# Patient Record
Sex: MALE | Race: WHITE | NOT HISPANIC OR LATINO
[De-identification: names, ages, dates, MRNs, and addresses within clinical notes are randomized per-mention and may not be internally consistent; named-entity substitution may affect disease eponyms.]

---

## 2017-01-05 ENCOUNTER — APPOINTMENT (OUTPATIENT)
Dept: CARDIOLOGY | Facility: CLINIC | Age: 57
End: 2017-01-05

## 2017-01-05 ENCOUNTER — NON-APPOINTMENT (OUTPATIENT)
Age: 57
End: 2017-01-05

## 2017-01-05 VITALS
BODY MASS INDEX: 32.35 KG/M2 | DIASTOLIC BLOOD PRESSURE: 66 MMHG | HEIGHT: 70 IN | SYSTOLIC BLOOD PRESSURE: 140 MMHG | HEART RATE: 68 BPM | WEIGHT: 226 LBS

## 2017-02-14 ENCOUNTER — APPOINTMENT (OUTPATIENT)
Dept: CARDIOLOGY | Facility: CLINIC | Age: 57
End: 2017-02-14

## 2018-03-20 ENCOUNTER — APPOINTMENT (OUTPATIENT)
Dept: CARDIOLOGY | Facility: CLINIC | Age: 58
End: 2018-03-20

## 2018-06-13 ENCOUNTER — APPOINTMENT (OUTPATIENT)
Dept: CARDIOLOGY | Facility: CLINIC | Age: 58
End: 2018-06-13

## 2018-12-31 ENCOUNTER — APPOINTMENT (OUTPATIENT)
Dept: CARDIOLOGY | Facility: CLINIC | Age: 58
End: 2018-12-31
Payer: COMMERCIAL

## 2018-12-31 ENCOUNTER — NON-APPOINTMENT (OUTPATIENT)
Age: 58
End: 2018-12-31

## 2018-12-31 VITALS
HEART RATE: 72 BPM | BODY MASS INDEX: 32.07 KG/M2 | OXYGEN SATURATION: 100 % | WEIGHT: 224 LBS | DIASTOLIC BLOOD PRESSURE: 76 MMHG | HEIGHT: 70 IN | SYSTOLIC BLOOD PRESSURE: 153 MMHG

## 2018-12-31 VITALS — SYSTOLIC BLOOD PRESSURE: 126 MMHG | DIASTOLIC BLOOD PRESSURE: 74 MMHG

## 2018-12-31 DIAGNOSIS — N40.0 BENIGN PROSTATIC HYPERPLASIA WITHOUT LOWER URINARY TRACT SYMPMS: ICD-10-CM

## 2018-12-31 DIAGNOSIS — Z79.899 OTHER LONG TERM (CURRENT) DRUG THERAPY: ICD-10-CM

## 2018-12-31 PROCEDURE — 99214 OFFICE O/P EST MOD 30 MIN: CPT

## 2018-12-31 PROCEDURE — 93000 ELECTROCARDIOGRAM COMPLETE: CPT

## 2018-12-31 PROCEDURE — 36415 COLL VENOUS BLD VENIPUNCTURE: CPT

## 2018-12-31 RX ORDER — DEXTROAMPHETAMINE SACCHARATE, AMPHETAMINE ASPARTATE, DEXTROAMPHETAMINE SULFATE AND AMPHETAMINE SULFATE 2.5; 2.5; 2.5; 2.5 MG/1; MG/1; MG/1; MG/1
10 TABLET ORAL
Qty: 30 | Refills: 0 | Status: DISCONTINUED | COMMUNITY
Start: 2016-12-13 | End: 2018-12-31

## 2018-12-31 RX ORDER — DOXAZOSIN 2 MG/1
2 TABLET ORAL
Qty: 30 | Refills: 0 | Status: DISCONTINUED | COMMUNITY
Start: 2016-03-28 | End: 2018-12-31

## 2018-12-31 NOTE — HISTORY OF PRESENT ILLNESS
[FreeTextEntry1] : Two-year hiatus, reporting that he has been feeling well. He is generally active, but has not been exercising and has gained weight. No effort provoked symptoms with activities of daily living, and stair climbing.\par No c/o  throat,jaw, arm or upper back discomfort.  No dyspnea, orthopnea or PND.  No palpitations, dizziness or syncope.  No edema or claudication.\par \par Discontinued CPAP after a short trial. Wife still notes apneic periods.

## 2019-01-07 LAB
25(OH)D3 SERPL-MCNC: 28.3 NG/ML
ALBUMIN SERPL ELPH-MCNC: 4 G/DL
ALP BLD-CCNC: 50 U/L
ALT SERPL-CCNC: 24 U/L
ANION GAP SERPL CALC-SCNC: 10 MMOL/L
AST SERPL-CCNC: 19 U/L
BASOPHILS # BLD AUTO: 0.02 K/UL
BASOPHILS NFR BLD AUTO: 0.4 %
BILIRUB SERPL-MCNC: 1 MG/DL
BUN SERPL-MCNC: 15 MG/DL
CALCIUM SERPL-MCNC: 9.4 MG/DL
CHLORIDE SERPL-SCNC: 106 MMOL/L
CHOLEST SERPL-MCNC: 134 MG/DL
CHOLEST/HDLC SERPL: 3 RATIO
CO2 SERPL-SCNC: 27 MMOL/L
CREAT SERPL-MCNC: 0.95 MG/DL
EOSINOPHIL # BLD AUTO: 0.42 K/UL
EOSINOPHIL NFR BLD AUTO: 7.6 %
GLUCOSE SERPL-MCNC: 117 MG/DL
HBA1C MFR BLD HPLC: 6 %
HCT VFR BLD CALC: 45 %
HDLC SERPL-MCNC: 44 MG/DL
HGB BLD-MCNC: 14.5 G/DL
IMM GRANULOCYTES NFR BLD AUTO: 0.2 %
LDLC SERPL CALC-MCNC: 76 MG/DL
LYMPHOCYTES # BLD AUTO: 1.36 K/UL
LYMPHOCYTES NFR BLD AUTO: 24.5 %
MAN DIFF?: NORMAL
MCHC RBC-ENTMCNC: 31.2 PG
MCHC RBC-ENTMCNC: 32.2 GM/DL
MCV RBC AUTO: 96.8 FL
MONOCYTES # BLD AUTO: 0.42 K/UL
MONOCYTES NFR BLD AUTO: 7.6 %
NEUTROPHILS # BLD AUTO: 3.33 K/UL
NEUTROPHILS NFR BLD AUTO: 59.7 %
PLATELET # BLD AUTO: 210 K/UL
POTASSIUM SERPL-SCNC: 4.9 MMOL/L
PROT SERPL-MCNC: 7 G/DL
PSA FREE FLD-MCNC: 16 %
PSA FREE SERPL-MCNC: 0.33 NG/ML
PSA SERPL-MCNC: 2.07 NG/ML
RBC # BLD: 4.65 M/UL
RBC # FLD: 13.8 %
SODIUM SERPL-SCNC: 143 MMOL/L
TRIGL SERPL-MCNC: 70 MG/DL
WBC # FLD AUTO: 5.56 K/UL

## 2019-05-17 ENCOUNTER — APPOINTMENT (OUTPATIENT)
Dept: GASTROENTEROLOGY | Facility: CLINIC | Age: 59
End: 2019-05-17
Payer: COMMERCIAL

## 2019-05-17 VITALS
BODY MASS INDEX: 31.5 KG/M2 | HEIGHT: 70 IN | DIASTOLIC BLOOD PRESSURE: 81 MMHG | HEART RATE: 76 BPM | WEIGHT: 220 LBS | SYSTOLIC BLOOD PRESSURE: 140 MMHG

## 2019-05-17 DIAGNOSIS — K60.3 ANAL FISTULA: ICD-10-CM

## 2019-05-17 DIAGNOSIS — L29.0 PRURITUS ANI: ICD-10-CM

## 2019-05-17 PROCEDURE — 82274 ASSAY TEST FOR BLOOD FECAL: CPT | Mod: QW

## 2019-05-17 PROCEDURE — 99243 OFF/OP CNSLTJ NEW/EST LOW 30: CPT

## 2019-05-17 NOTE — HISTORY OF PRESENT ILLNESS
[FreeTextEntry1] : Sigrid has undergone at least 2-3 colonoscopies over the years (Dr. Blanc), found to have polyps on one occasion, but none on the most recent procedure 5 years ago. He had undergone surgery for anal fistula, now with intermittent pruritus ani and soiling. He also has had episodic nonprogressive dysphagia to rice. Family history is negative for GI neoplasia.

## 2019-05-17 NOTE — ASSESSMENT
[FreeTextEntry1] : 1. History of colon polyp, last colonoscopy 2014--rule out metachronous colorectal neoplasia.\par 2. History of anal fistula, status post surgery. Intermittent pruritus ani and soiling.\par 3. Episodic nonprogressive dysphagia--- suspect esophageal motility disorder.\par 4. Obesity.\par 5. Borderline type 2 diabetes mellitus.\par 6. Obstructive sleep apnea.\par 7. 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 Manriquez.\par 8. Hyperlipidemia.\par 9. History of depression.\par 10. Status post umbilical herniorrhaphy, TURP.\par \par Plan:\par 1. Forward labs for my review.\par 2. Medical record release for Dr. Blanc.\par 3. Agree with surveillance colonoscopy--Procedure, rationale, alternatives, material risks, anesthesia plan, MiraLax prep instructions were reviewed and brochure given.\par

## 2019-05-17 NOTE — REVIEW OF SYSTEMS
[Anxiety] : anxiety [Depression] : depression [Negative] : Endocrine [As Noted in HPI] : as noted in HPI

## 2019-05-17 NOTE — CONSULT LETTER
[Dear  ___] : Dear  [unfilled], [Consult Letter:] : I had the pleasure of evaluating your patient, [unfilled]. [Please see my note below.] : Please see my note below. [Consult Closing:] : Thank you very much for allowing me to participate in the care of this patient.  If you have any questions, please do not hesitate to contact me. [Sincerely,] : Sincerely, [FreeTextEntry3] : Vance James M.D.\par  [DrDay  ___] : Dr. LEAVITT

## 2019-05-17 NOTE — PHYSICAL EXAM
[General Appearance - Alert] : alert [General Appearance - In No Acute Distress] : in no acute distress [General Appearance - Well Nourished] : well nourished [General Appearance - Well Developed] : well developed [Sclera] : the sclera and conjunctiva were normal [Outer Ear] : the ears and nose were normal in appearance [Oropharynx] : the oropharynx was normal [Neck Appearance] : the appearance of the neck was normal [Neck Cervical Mass (___cm)] : no neck mass was observed [Jugular Venous Distention Increased] : there was no jugular-venous distention [Thyroid Diffuse Enlargement] : the thyroid was not enlarged [Thyroid Nodule] : there were no palpable thyroid nodules [Auscultation Breath Sounds / Voice Sounds] : lungs were clear to auscultation bilaterally [Heart Rate And Rhythm] : heart rate was normal and rhythm regular [Heart Sounds] : normal S1 and S2 [Heart Sounds Gallop] : no gallops [Murmurs] : no murmurs [Heart Sounds Pericardial Friction Rub] : no pericardial rub [Full Pulse] : the pedal pulses are present [Edema] : there was no peripheral edema [Bowel Sounds] : normal bowel sounds [Abdomen Soft] : soft [Abdomen Tenderness] : non-tender [Abdomen Mass (___ Cm)] : no abdominal mass palpated [Abdomen Hernia] : no hernia was discovered [Normal Sphincter Tone] : normal sphincter tone [No Rectal Mass] : no rectal mass [Prostate Enlargement] : the prostate was not enlarged [Prostate Tenderness] : the prostate was not tender [Cervical Lymph Nodes Enlarged Posterior Bilaterally] : posterior cervical [Cervical Lymph Nodes Enlarged Anterior Bilaterally] : anterior cervical [Supraclavicular Lymph Nodes Enlarged Bilaterally] : supraclavicular [Axillary Lymph Nodes Enlarged Bilaterally] : axillary [Femoral Lymph Nodes Enlarged Bilaterally] : femoral [Inguinal Lymph Nodes Enlarged Bilaterally] : inguinal [No CVA Tenderness] : no ~M costovertebral angle tenderness [No Spinal Tenderness] : no spinal tenderness [Abnormal Walk] : normal gait [Nail Clubbing] : no clubbing  or cyanosis of the fingernails [Musculoskeletal - Swelling] : no joint swelling seen [Motor Tone] : muscle strength and tone were normal [Skin Color & Pigmentation] : normal skin color and pigmentation [Skin Turgor] : normal skin turgor [] : no rash [Oriented To Time, Place, And Person] : oriented to person, place, and time [Impaired Insight] : insight and judgment were intact [Affect] : the affect was normal [Internal Hemorrhoid] : no internal hemorrhoids [External Hemorrhoid] : no external hemorrhoids [Occult Blood Positive] : stool was negative for occult blood [FreeTextEntry1] : FIT negative

## 2019-06-24 ENCOUNTER — LABORATORY RESULT (OUTPATIENT)
Age: 59
End: 2019-06-24

## 2019-06-24 ENCOUNTER — APPOINTMENT (OUTPATIENT)
Dept: GASTROENTEROLOGY | Facility: CLINIC | Age: 59
End: 2019-06-24
Payer: COMMERCIAL

## 2019-06-24 PROCEDURE — 45380 COLONOSCOPY AND BIOPSY: CPT | Mod: 33

## 2019-12-30 ENCOUNTER — NON-APPOINTMENT (OUTPATIENT)
Age: 59
End: 2019-12-30

## 2019-12-30 ENCOUNTER — APPOINTMENT (OUTPATIENT)
Dept: CARDIOLOGY | Facility: CLINIC | Age: 59
End: 2019-12-30
Payer: COMMERCIAL

## 2019-12-30 VITALS
DIASTOLIC BLOOD PRESSURE: 83 MMHG | HEART RATE: 75 BPM | BODY MASS INDEX: 32.64 KG/M2 | WEIGHT: 228 LBS | OXYGEN SATURATION: 96 % | SYSTOLIC BLOOD PRESSURE: 137 MMHG | HEIGHT: 70 IN

## 2019-12-30 VITALS — DIASTOLIC BLOOD PRESSURE: 82 MMHG | SYSTOLIC BLOOD PRESSURE: 128 MMHG

## 2019-12-30 PROCEDURE — 99214 OFFICE O/P EST MOD 30 MIN: CPT

## 2019-12-30 PROCEDURE — 93000 ELECTROCARDIOGRAM COMPLETE: CPT

## 2019-12-30 NOTE — HISTORY OF PRESENT ILLNESS
[FreeTextEntry1] : Presents afater a 1 year hiatus, reporting that he has been feeling well. He is generally active, but has not been exercising and has gained weight. No effort provoked symptoms with activities of daily living, and stair climbing.\par No c/o  throat,jaw, arm or upper back discomfort.  No dyspnea, orthopnea or PND.  No palpitations, dizziness or syncope.  No edema or claudication.\par \par

## 2020-01-20 ENCOUNTER — INPATIENT (INPATIENT)
Facility: HOSPITAL | Age: 60
LOS: 2 days | Discharge: HOME | DRG: 390 | End: 2020-01-23
Attending: SURGERY | Admitting: SURGERY
Payer: COMMERCIAL

## 2020-01-20 VITALS
DIASTOLIC BLOOD PRESSURE: 94 MMHG | HEART RATE: 80 BPM | HEIGHT: 70 IN | OXYGEN SATURATION: 99 % | TEMPERATURE: 98 F | WEIGHT: 225.09 LBS | SYSTOLIC BLOOD PRESSURE: 165 MMHG | RESPIRATION RATE: 20 BRPM

## 2020-01-20 DIAGNOSIS — K42.9 UMBILICAL HERNIA WITHOUT OBSTRUCTION OR GANGRENE: Chronic | ICD-10-CM

## 2020-01-20 LAB
ALBUMIN SERPL ELPH-MCNC: 4.7 G/DL — SIGNIFICANT CHANGE UP (ref 3.3–5)
ALP SERPL-CCNC: 68 U/L — SIGNIFICANT CHANGE UP (ref 40–120)
ALT FLD-CCNC: 36 U/L — SIGNIFICANT CHANGE UP (ref 10–45)
ANION GAP SERPL CALC-SCNC: 14 MMOL/L — SIGNIFICANT CHANGE UP (ref 5–17)
APTT BLD: 29.8 SEC — SIGNIFICANT CHANGE UP (ref 27.5–36.3)
AST SERPL-CCNC: 21 U/L — SIGNIFICANT CHANGE UP (ref 10–40)
BASE EXCESS BLDV CALC-SCNC: 5.5 MMOL/L — HIGH (ref -2–2)
BASOPHILS # BLD AUTO: 0.05 K/UL — SIGNIFICANT CHANGE UP (ref 0–0.2)
BASOPHILS NFR BLD AUTO: 0.4 % — SIGNIFICANT CHANGE UP (ref 0–2)
BILIRUB SERPL-MCNC: 1.4 MG/DL — HIGH (ref 0.2–1.2)
BUN SERPL-MCNC: 17 MG/DL — SIGNIFICANT CHANGE UP (ref 7–23)
CA-I SERPL-SCNC: 1.25 MMOL/L — SIGNIFICANT CHANGE UP (ref 1.12–1.3)
CALCIUM SERPL-MCNC: 10.3 MG/DL — SIGNIFICANT CHANGE UP (ref 8.4–10.5)
CHLORIDE BLDV-SCNC: 106 MMOL/L — SIGNIFICANT CHANGE UP (ref 96–108)
CHLORIDE SERPL-SCNC: 101 MMOL/L — SIGNIFICANT CHANGE UP (ref 96–108)
CO2 BLDV-SCNC: 35 MMOL/L — HIGH (ref 22–30)
CO2 SERPL-SCNC: 26 MMOL/L — SIGNIFICANT CHANGE UP (ref 22–31)
CREAT SERPL-MCNC: 0.86 MG/DL — SIGNIFICANT CHANGE UP (ref 0.5–1.3)
EOSINOPHIL # BLD AUTO: 0.19 K/UL — SIGNIFICANT CHANGE UP (ref 0–0.5)
EOSINOPHIL NFR BLD AUTO: 1.5 % — SIGNIFICANT CHANGE UP (ref 0–6)
GAS PNL BLDV: 138 MMOL/L — SIGNIFICANT CHANGE UP (ref 135–145)
GAS PNL BLDV: SIGNIFICANT CHANGE UP
GAS PNL BLDV: SIGNIFICANT CHANGE UP
GLUCOSE BLDV-MCNC: 143 MG/DL — HIGH (ref 70–99)
GLUCOSE SERPL-MCNC: 146 MG/DL — HIGH (ref 70–99)
HCO3 BLDV-SCNC: 33 MMOL/L — HIGH (ref 21–29)
HCT VFR BLD CALC: 49.7 % — SIGNIFICANT CHANGE UP (ref 39–50)
HCT VFR BLDA CALC: 54 % — HIGH (ref 39–50)
HGB BLD CALC-MCNC: 17.8 G/DL — HIGH (ref 13–17)
HGB BLD-MCNC: 17.4 G/DL — HIGH (ref 13–17)
IMM GRANULOCYTES NFR BLD AUTO: 0.3 % — SIGNIFICANT CHANGE UP (ref 0–1.5)
INR BLD: 1.07 RATIO — SIGNIFICANT CHANGE UP (ref 0.88–1.16)
LACTATE BLDV-MCNC: 1.6 MMOL/L — SIGNIFICANT CHANGE UP (ref 0.7–2)
LIDOCAIN IGE QN: 14 U/L — SIGNIFICANT CHANGE UP (ref 7–60)
LYMPHOCYTES # BLD AUTO: 1.19 K/UL — SIGNIFICANT CHANGE UP (ref 1–3.3)
LYMPHOCYTES # BLD AUTO: 9.3 % — LOW (ref 13–44)
MCHC RBC-ENTMCNC: 31.9 PG — SIGNIFICANT CHANGE UP (ref 27–34)
MCHC RBC-ENTMCNC: 35 GM/DL — SIGNIFICANT CHANGE UP (ref 32–36)
MCV RBC AUTO: 91.2 FL — SIGNIFICANT CHANGE UP (ref 80–100)
MONOCYTES # BLD AUTO: 0.68 K/UL — SIGNIFICANT CHANGE UP (ref 0–0.9)
MONOCYTES NFR BLD AUTO: 5.3 % — SIGNIFICANT CHANGE UP (ref 2–14)
NEUTROPHILS # BLD AUTO: 10.64 K/UL — HIGH (ref 1.8–7.4)
NEUTROPHILS NFR BLD AUTO: 83.2 % — HIGH (ref 43–77)
NRBC # BLD: 0 /100 WBCS — SIGNIFICANT CHANGE UP (ref 0–0)
PCO2 BLDV: 59 MMHG — HIGH (ref 35–50)
PH BLDV: 7.37 — SIGNIFICANT CHANGE UP (ref 7.35–7.45)
PLATELET # BLD AUTO: 243 K/UL — SIGNIFICANT CHANGE UP (ref 150–400)
PO2 BLDV: <20 MMHG — LOW (ref 25–45)
POTASSIUM BLDV-SCNC: 4.4 MMOL/L — SIGNIFICANT CHANGE UP (ref 3.5–5.3)
POTASSIUM SERPL-MCNC: 4.3 MMOL/L — SIGNIFICANT CHANGE UP (ref 3.5–5.3)
POTASSIUM SERPL-SCNC: 4.3 MMOL/L — SIGNIFICANT CHANGE UP (ref 3.5–5.3)
PROT SERPL-MCNC: 7.7 G/DL — SIGNIFICANT CHANGE UP (ref 6–8.3)
PROTHROM AB SERPL-ACNC: 12.3 SEC — SIGNIFICANT CHANGE UP (ref 10–12.9)
RBC # BLD: 5.45 M/UL — SIGNIFICANT CHANGE UP (ref 4.2–5.8)
RBC # FLD: 12.9 % — SIGNIFICANT CHANGE UP (ref 10.3–14.5)
SAO2 % BLDV: 20 % — LOW (ref 67–88)
SODIUM SERPL-SCNC: 141 MMOL/L — SIGNIFICANT CHANGE UP (ref 135–145)
TROPONIN T, HIGH SENSITIVITY RESULT: <6 NG/L — SIGNIFICANT CHANGE UP (ref 0–51)
TROPONIN T, HIGH SENSITIVITY RESULT: <6 NG/L — SIGNIFICANT CHANGE UP (ref 0–51)
WBC # BLD: 12.79 K/UL — HIGH (ref 3.8–10.5)
WBC # FLD AUTO: 12.79 K/UL — HIGH (ref 3.8–10.5)

## 2020-01-20 PROCEDURE — 99218: CPT

## 2020-01-20 PROCEDURE — 93010 ELECTROCARDIOGRAM REPORT: CPT | Mod: 77

## 2020-01-20 RX ORDER — FAMOTIDINE 10 MG/ML
20 INJECTION INTRAVENOUS ONCE
Refills: 0 | Status: COMPLETED | OUTPATIENT
Start: 2020-01-20 | End: 2020-01-20

## 2020-01-20 RX ORDER — ATORVASTATIN CALCIUM 80 MG/1
40 TABLET, FILM COATED ORAL AT BEDTIME
Refills: 0 | Status: DISCONTINUED | OUTPATIENT
Start: 2020-01-20 | End: 2020-01-21

## 2020-01-20 RX ORDER — METFORMIN HYDROCHLORIDE 850 MG/1
500 TABLET ORAL
Refills: 0 | Status: DISCONTINUED | OUTPATIENT
Start: 2020-01-20 | End: 2020-01-21

## 2020-01-20 RX ORDER — PANTOPRAZOLE SODIUM 20 MG/1
40 TABLET, DELAYED RELEASE ORAL ONCE
Refills: 0 | Status: COMPLETED | OUTPATIENT
Start: 2020-01-20 | End: 2020-01-20

## 2020-01-20 RX ORDER — ACETAMINOPHEN 500 MG
975 TABLET ORAL ONCE
Refills: 0 | Status: COMPLETED | OUTPATIENT
Start: 2020-01-20 | End: 2020-01-20

## 2020-01-20 RX ORDER — ONDANSETRON 8 MG/1
4 TABLET, FILM COATED ORAL ONCE
Refills: 0 | Status: COMPLETED | OUTPATIENT
Start: 2020-01-20 | End: 2020-01-20

## 2020-01-20 RX ORDER — LIDOCAINE 4 G/100G
10 CREAM TOPICAL ONCE
Refills: 0 | Status: COMPLETED | OUTPATIENT
Start: 2020-01-20 | End: 2020-01-20

## 2020-01-20 RX ADMIN — Medication 30 MILLILITER(S): at 13:37

## 2020-01-20 RX ADMIN — ONDANSETRON 4 MILLIGRAM(S): 8 TABLET, FILM COATED ORAL at 15:15

## 2020-01-20 RX ADMIN — Medication 10 MILLIGRAM(S): at 20:15

## 2020-01-20 RX ADMIN — ONDANSETRON 4 MILLIGRAM(S): 8 TABLET, FILM COATED ORAL at 23:50

## 2020-01-20 RX ADMIN — FAMOTIDINE 20 MILLIGRAM(S): 10 INJECTION INTRAVENOUS at 13:37

## 2020-01-20 RX ADMIN — Medication 975 MILLIGRAM(S): at 19:42

## 2020-01-20 RX ADMIN — LIDOCAINE 10 MILLILITER(S): 4 CREAM TOPICAL at 19:42

## 2020-01-20 RX ADMIN — PANTOPRAZOLE SODIUM 40 MILLIGRAM(S): 20 TABLET, DELAYED RELEASE ORAL at 19:46

## 2020-01-20 NOTE — ED PROVIDER NOTE - NSFOLLOWUPINSTRUCTIONS_ED_ALL_ED_FT
Follow up with Dr. Ly tomorrow regarding your symptoms - it is felt that your symptoms may  be related to reflux or gastritis but you should get follow up for possible stress test to determine if this is atypical cardiac symptoms  Bring a copy of your test results with you to your appointment  Continue your current medication regimen  Return to the Emergency Room if you experience new or worsening symptoms  Take pepcid 2x per day   Take maalox before meals    Chest Pain    Chest pain can be caused by many different conditions which may or may not be dangerous. Causes include heartburn, lung infections, heart attack, blood clot in lungs, skin infections, strain or damage to muscle, cartilage, or bones, etc. In addition to a history and physical examination, an electrocardiogram (ECG) or other lab tests may have been performed to determine the cause of your chest pain. Follow up with your primary care provider or with a cardiologist as instructed.     SEEK IMMEDIATE MEDICAL CARE IF YOU HAVE ANY OF THE FOLLOWING SYMPTOMS: worsening chest pain, coughing up blood, unexplained back/neck/jaw pain, severe abdominal pain, dizziness or lightheadedness, fainting, shortness of breath, sweaty or clammy skin, vomiting, or racing heart beat. These symptoms may represent a serious problem that is an emergency. Do not wait to see if the symptoms will go away. Get medical help right away. Call 911 and do not drive yourself to the hospital.       Abdominal Pain    Many things can cause abdominal pain. Many times, abdominal pain is not caused by a disease and will improve without treatment. Your health care provider will do a physical exam to determine if there is a dangerous cause of your pain; blood tests and imaging may help determine the cause of your pain. However, in many cases, no cause may be found and you may need further testing as an outpatient. Monitor your abdominal pain for any changes.     SEEK IMMEDIATE MEDICAL CARE IF YOU HAVE ANY OF THE FOLLOWING SYMPTOMS: worsening abdominal pain, uncontrollable vomiting, profuse diarrhea, inability to have bowel movements or pass gas, black or bloody stools, fever accompanying chest pain or back pain, or fainting. These symptoms may represent a serious problem that is an emergency. Do not wait to see if the symptoms will go away. Get medical help right away. Call 911 and do not drive yourself to the hospital.

## 2020-01-20 NOTE — ED CDU PROVIDER DISPOSITION NOTE - ATTENDING CONTRIBUTION TO CARE
59 M w/ PMH of HLD, HTN Depression and recent MRI of the abdomen 2 weeks ago, found to have pneumatosis of the jejunum and SBO seen by sx w/ NG tube placed. Pt initially admitted for nuclear stress test but overnight was found to have concerning findings on CT scan. Pt currently w/ improvement of abd pain on my exam at 934 AM. Has NG tube in place, abd w/ no tenderness, hypoactive bowel sounds in all 4 quadrants. will tx w/ zosyn.

## 2020-01-20 NOTE — ED CDU PROVIDER DISPOSITION NOTE - NSFOLLOWUPINSTRUCTIONS_ED_ALL_ED_FT
1. Follow up with your PCP in 1-2 days.     Additionally please follow up with your cardiologist within 2-3 days.     2. Show copies of your reports given to you.       Take all of your other medications as previously prescribed.     3. Please return to the ED immediately if you develop any worsening or continued chest pain, shortness of breath, palpitations, weakness, nausea/vomiting, lightheadedness, or for any other concerning symptoms.

## 2020-01-20 NOTE — ED CDU PROVIDER DISPOSITION NOTE - CLINICAL COURSE
60 y/o male HTN, HLD presents to the ED for epigastric pain. patient states last night he began feeling bloated. this morning had breakfast, felt fine and then a few hours later felt epigastric pain, pressure in the area described as a "discomfort" rather than true pain, belching (onset 8 am). pain was radiating to the lower abdomen. no vomiting/diarrhea. no fever/chills. no chest pain or sob. abd pain did not resolve with tylenol or tums.  Cardio: Dr. Cerna  ED Course: WBC 12.79, Trop <6, remainder of labs including lipase non-actionable. CXR-punctate calcified granulomas. Pt evaluated by his cardiologist in the ED, referred to CDU for tele, frequent eval, and stress test.  CDU Course: Stress test shows____. Cardiology recommends____. 60 y/o male HTN, HLD presents to the ED for epigastric pain. patient states last night he began feeling bloated. this morning had breakfast, felt fine and then a few hours later felt epigastric pain, pressure in the area described as a "discomfort" rather than true pain, belching (onset 8 am). pain was radiating to the lower abdomen. no vomiting/diarrhea. no fever/chills. no chest pain or sob. abd pain did not resolve with tylenol or tums.  Cardio: Dr. Cerna  ED Course: WBC 12.79, Trop <6, remainder of labs including lipase non-actionable. CXR-punctate calcified granulomas. Pt evaluated by his cardiologist in the ED, referred to CDU for tele, frequent eval, and stress test.  CDU Course: pt had continued epigastric pain, in setting of gallstones, surgery consulted. Surgery recommended CT abdomen which revealed SBO. NG tube placed successfully by Surgery Resident. Pt reports relief. pt admitted to their service.

## 2020-01-20 NOTE — ED PROVIDER NOTE - PROGRESS NOTE DETAILS
discussed results with patient, feeling improved at this time, offered CDU for stress/cardiac testing tomorrow patient prefers to follow up with Dr. Ly. understands return precautions - Chen Faulkner PA-C patient discussed with his cardiologist and would now rather get testing done while he is here. patient cardiologist to bedside who agrees with plan to proceed with stress test from cdu - Chen Faulkner PA-C

## 2020-01-20 NOTE — ED ADULT NURSE NOTE - CHPI ED NUR SYMPTOMS NEG
no hematuria/no blood in stool/no dysuria/no diarrhea/no vomiting/no burning urination/no abdominal distension/no chills/no fever/no nausea

## 2020-01-20 NOTE — ED ADULT NURSE NOTE - OBJECTIVE STATEMENT
58 yo M aaox4 c/o abd pain. Near epigastric area. Denies NVD. Denies CP dizziness weakness or sob. IV line placed labs drawn and sent. EKG done and pt placed on CM. PMH abd hernia.

## 2020-01-20 NOTE — ED CDU PROVIDER INITIAL DAY NOTE - ATTENDING CONTRIBUTION TO CARE
****ATTENDING**** 60yo male hx listed presents with epigastric discomfort this morning. States the symptoms were dull and tightness and took time to relieve with time and tums. + belching. Pt denies chest pain, palp, sob. + diaphoresis w symptoms. No abd pain, n/v/d. Nml bms.   No recent travel.   on exam, Patient is awake,alert,oriented x 3. Patient is well appearing and in no acute distress. Patient's chest is clear to ausculation, +s1s2. Abdomen is soft nd/nt +BS. Extremity with no swelling or calf tenderness.   Pt is well appearing, EKG reviewed. Stress test many years ago.  Labs and EKG reviewed. Patient's cardiologist in ED, agree w plan.   Admit to CDU for observation, tele monitoring and stress test.

## 2020-01-20 NOTE — ED PROVIDER NOTE - OBJECTIVE STATEMENT
60 y/o male HTN, HLD presents to the ED for epigastric pain. patient states last night he began feeling bloated. this morning had breakfast, felt fine and then a few hours later felt epigastric pain, pressure in the area described as a "discomfort" rather than true pain, belching. pain was radiating to the lower abdomen. no vomiting/diarrhea. no fever/chills. no chest pain or sob. abd pain did not resolve with tylenol or tums. 58 y/o male HTN, HLD presents to the ED for epigastric pain. patient states last night he began feeling bloated. this morning had breakfast, felt fine and then a few hours later felt epigastric pain, pressure in the area described as a "discomfort" rather than true pain, belching (onset 8 am). pain was radiating to the lower abdomen. no vomiting/diarrhea. no fever/chills. no chest pain or sob. abd pain did not resolve with tylenol or tums. 60 y/o male HTN, HLD presents to the ED for epigastric pain. patient states last night he began feeling bloated. this morning had breakfast, felt fine and then a few hours later felt epigastric pain, pressure in the area described as a "discomfort" rather than true pain, belching (onset 8 am). pain was radiating to the lower abdomen. no vomiting/diarrhea. no fever/chills. no chest pain or sob. abd pain did not resolve with tylenol or tums.  fawad martínez

## 2020-01-20 NOTE — ED PROVIDER NOTE - ATTENDING CONTRIBUTION TO CARE
****ATTENDING**** 60yo male hx listed presents with epigastric discomfort this morning. States the symptoms were dull and tightness and took time to relieve with time and tums. + belching. Pt denies chest pain, palp, sob. + diaphoresis w symptoms. No abd pain, n/v/d. Nml bms.   No recent travel.   on exam, Patient is awake,alert,oriented x 3. Patient is well appearing and in no acute distress. Patient's chest is clear to ausculation, +s1s2. Abdomen is soft nd/nt +BS. Extremity with no swelling or calf tenderness.   Pt is well appearing, EKG reviewed. Stress test many years ago.  Check labs, Xray chest. Eval for ACS.

## 2020-01-20 NOTE — ED CDU PROVIDER INITIAL DAY NOTE - PROGRESS NOTE DETAILS
pt c/o pain in the epigastrum after eating. will give GI cocktail, EKG ordered. CDU PROGRESS NOTE CARLOZ ROMERO: Received pt at 1900 sign-out. Pt resting in stretcher in NAD. Case/plan reviewed. VSS. EKG NSR rate 80. Pt is aox3, ambulatory around unit with steady gait. S1 S2 noted, RRR, lungs CTA b/l, BS x4 with soft, nontender abdomen. Pt was medicated for pain and states pain is subsiding. Will continue to monitor overnight. CDU PROGRESS NOTE PA NICK: Pt resting in stretcher in NAD. Case/plan reviewed. VSS. EKG NSR rate 80. Pt is aox3, ambulatory around unit with steady gait. S1 S2 noted, RRR, lungs CTA b/l, BS x4 with soft, nontender abdomen. Pt was medicated for pain and states pain is subsiding. Will continue to monitor overnight. CDU PROGRESS NOTE CARLOZ ROMERO: Pt resting in stretcher in NAD, reports pain is intermittent and has subsided now, but currently with nausea. rate 80.  Abdomen BS x4 with soft, nontender abdomen, no rebound or guarding. Will give Zofran 4mg IVP and continue to monitor.

## 2020-01-20 NOTE — ED CDU PROVIDER INITIAL DAY NOTE - PHYSICAL EXAMINATION
GEN: Pt in NAD, A&O x3.  PSYCH: Affect appropriate.  EYES: Sclera white w/o injection.   ENT: Head NCAT. Nose w/o deformity. No auricular TTP. MMM. Neck supple FROM.   RESP: No chest wall tenderness, CTA b/l, no wheezes, rales, or rhonchi.   CARDIAC: RRR, clear distinct S1, S2, no appreciable murmurs.  ABD: Abdomen without obvious deformity. Abdomen soft, mild tenderness to palpation in the epigastric area, ngative Rodriguez's sign, no rebound or guarding. No CVAT b/l.  VASC: 2+ radial and dorsalis pedis pulses b/l. No edema or calf tenderness.  SKIN: No rashes on the trunk. GEN: Pt in NAD, A&O x3.  PSYCH: Affect appropriate.  EYES: Sclera white w/o injection.   ENT: Head NCAT. Nose w/o deformity. No auricular TTP. MMM. Neck supple FROM.   RESP: No chest wall tenderness, CTA b/l, no wheezes, rales, or rhonchi.   CARDIAC: RRR, clear distinct S1, S2, no appreciable murmurs.  ABD: Abdomen without obvious deformity. Abdomen soft, mild tenderness to palpation in the epigastric area, negative Rodriguez's sign, no rebound or guarding. No CVAT b/l.  VASC: 2+ radial and dorsalis pedis pulses b/l. No edema or calf tenderness.  SKIN: No rashes on the trunk.

## 2020-01-20 NOTE — ED CDU PROVIDER INITIAL DAY NOTE - OBJECTIVE STATEMENT
60 y/o male HTN, HLD presents to the ED for epigastric pain. patient states last night he began feeling bloated. this morning had breakfast, felt fine and then a few hours later felt epigastric pain, pressure in the area described as a "discomfort" rather than true pain, belching (onset 8 am), +associated nausea, 5/10, nothing made it feel better. pain was radiating to the lower abdomen. no vomiting/diarrhea. no fever/chills. no chest pain or sob. abd pain did not resolve with tylenol or tums.  Cardio: Dr. Cerna  ED Course: WBC 12.79, Trop <6, remainder of labs including lipase non-actionable. CXR-punctate calcified granulomas. Pt evaluated by his cardiologist in the ED, referred to CDU for tele, frequent eval, and stress test. 60 y/o male HTN, HLD presents to the ED for epigastric pain. patient states last night he began feeling bloated. this morning had breakfast, felt fine and then a few hours later felt epigastric pain, pressure in the area described as a "discomfort" rather than true pain, belching (onset 8 am), +associated nausea, 5/10, nothing made it feel better. pain was radiating to the lower abdomen. no vomiting/diarrhea. no fever/chills. no chest pain or sob. abd pain did not resolve with tylenol or tums.  Cardio: Dr. Cerna  ED Course: WBC 12.79, Trop <6, remainder of labs including lipase non-actionable. CXR- punctate calcified granulomas. Pt evaluated by his cardiologist in the ED, referred to CDU for tele, frequent eval, and stress test.

## 2020-01-21 DIAGNOSIS — Z90.79 ACQUIRED ABSENCE OF OTHER GENITAL ORGAN(S): Chronic | ICD-10-CM

## 2020-01-21 DIAGNOSIS — K56.609 UNSPECIFIED INTESTINAL OBSTRUCTION, UNSPECIFIED AS TO PARTIAL VERSUS COMPLETE OBSTRUCTION: ICD-10-CM

## 2020-01-21 LAB
ALBUMIN SERPL ELPH-MCNC: 3.6 G/DL — SIGNIFICANT CHANGE UP (ref 3.3–5)
ALBUMIN SERPL ELPH-MCNC: 4.3 G/DL — SIGNIFICANT CHANGE UP (ref 3.3–5)
ALP SERPL-CCNC: 49 U/L — SIGNIFICANT CHANGE UP (ref 40–120)
ALP SERPL-CCNC: 60 U/L — SIGNIFICANT CHANGE UP (ref 40–120)
ALT FLD-CCNC: 28 U/L — SIGNIFICANT CHANGE UP (ref 10–45)
ALT FLD-CCNC: 34 U/L — SIGNIFICANT CHANGE UP (ref 10–45)
ANION GAP SERPL CALC-SCNC: 12 MMOL/L — SIGNIFICANT CHANGE UP (ref 5–17)
ANION GAP SERPL CALC-SCNC: 12 MMOL/L — SIGNIFICANT CHANGE UP (ref 5–17)
AST SERPL-CCNC: 17 U/L — SIGNIFICANT CHANGE UP (ref 10–40)
AST SERPL-CCNC: 24 U/L — SIGNIFICANT CHANGE UP (ref 10–40)
BASE EXCESS BLDV CALC-SCNC: 3.6 MMOL/L — HIGH (ref -2–2)
BASOPHILS # BLD AUTO: 0.04 K/UL — SIGNIFICANT CHANGE UP (ref 0–0.2)
BASOPHILS NFR BLD AUTO: 0.5 % — SIGNIFICANT CHANGE UP (ref 0–2)
BILIRUB SERPL-MCNC: 1.5 MG/DL — HIGH (ref 0.2–1.2)
BILIRUB SERPL-MCNC: 1.6 MG/DL — HIGH (ref 0.2–1.2)
BUN SERPL-MCNC: 15 MG/DL — SIGNIFICANT CHANGE UP (ref 7–23)
BUN SERPL-MCNC: 16 MG/DL — SIGNIFICANT CHANGE UP (ref 7–23)
CA-I SERPL-SCNC: 1.23 MMOL/L — SIGNIFICANT CHANGE UP (ref 1.12–1.3)
CALCIUM SERPL-MCNC: 10.1 MG/DL — SIGNIFICANT CHANGE UP (ref 8.4–10.5)
CALCIUM SERPL-MCNC: 9.1 MG/DL — SIGNIFICANT CHANGE UP (ref 8.4–10.5)
CHLORIDE BLDV-SCNC: 106 MMOL/L — SIGNIFICANT CHANGE UP (ref 96–108)
CHLORIDE SERPL-SCNC: 103 MMOL/L — SIGNIFICANT CHANGE UP (ref 96–108)
CHLORIDE SERPL-SCNC: 105 MMOL/L — SIGNIFICANT CHANGE UP (ref 96–108)
CHOLEST SERPL-MCNC: 127 MG/DL — SIGNIFICANT CHANGE UP (ref 10–199)
CO2 BLDV-SCNC: 30 MMOL/L — SIGNIFICANT CHANGE UP (ref 22–30)
CO2 SERPL-SCNC: 25 MMOL/L — SIGNIFICANT CHANGE UP (ref 22–31)
CO2 SERPL-SCNC: 26 MMOL/L — SIGNIFICANT CHANGE UP (ref 22–31)
CREAT SERPL-MCNC: 0.88 MG/DL — SIGNIFICANT CHANGE UP (ref 0.5–1.3)
CREAT SERPL-MCNC: 0.88 MG/DL — SIGNIFICANT CHANGE UP (ref 0.5–1.3)
EOSINOPHIL # BLD AUTO: 0.12 K/UL — SIGNIFICANT CHANGE UP (ref 0–0.5)
EOSINOPHIL NFR BLD AUTO: 1.4 % — SIGNIFICANT CHANGE UP (ref 0–6)
GAS PNL BLDV: 139 MMOL/L — SIGNIFICANT CHANGE UP (ref 135–145)
GAS PNL BLDV: SIGNIFICANT CHANGE UP
GAS PNL BLDV: SIGNIFICANT CHANGE UP
GLUCOSE BLDC GLUCOMTR-MCNC: 112 MG/DL — HIGH (ref 70–99)
GLUCOSE BLDC GLUCOMTR-MCNC: 89 MG/DL — SIGNIFICANT CHANGE UP (ref 70–99)
GLUCOSE BLDV-MCNC: 95 MG/DL — SIGNIFICANT CHANGE UP (ref 70–99)
GLUCOSE SERPL-MCNC: 108 MG/DL — HIGH (ref 70–99)
GLUCOSE SERPL-MCNC: 164 MG/DL — HIGH (ref 70–99)
HBA1C BLD-MCNC: 5.8 % — HIGH (ref 4–5.6)
HCO3 BLDV-SCNC: 28 MMOL/L — SIGNIFICANT CHANGE UP (ref 21–29)
HCT VFR BLD CALC: 41 % — SIGNIFICANT CHANGE UP (ref 39–50)
HCT VFR BLD CALC: 45.8 % — SIGNIFICANT CHANGE UP (ref 39–50)
HCT VFR BLDA CALC: 46 % — SIGNIFICANT CHANGE UP (ref 39–50)
HDLC SERPL-MCNC: 44 MG/DL — SIGNIFICANT CHANGE UP
HGB BLD CALC-MCNC: 15.1 G/DL — SIGNIFICANT CHANGE UP (ref 13–17)
HGB BLD-MCNC: 14.2 G/DL — SIGNIFICANT CHANGE UP (ref 13–17)
HGB BLD-MCNC: 15.9 G/DL — SIGNIFICANT CHANGE UP (ref 13–17)
IMM GRANULOCYTES NFR BLD AUTO: 0.2 % — SIGNIFICANT CHANGE UP (ref 0–1.5)
LACTATE BLDV-MCNC: 1.1 MMOL/L — SIGNIFICANT CHANGE UP (ref 0.7–2)
LIDOCAIN IGE QN: 9 U/L — SIGNIFICANT CHANGE UP (ref 7–60)
LIPID PNL WITH DIRECT LDL SERPL: 71 MG/DL — SIGNIFICANT CHANGE UP
LYMPHOCYTES # BLD AUTO: 0.65 K/UL — LOW (ref 1–3.3)
LYMPHOCYTES # BLD AUTO: 7.3 % — LOW (ref 13–44)
MAGNESIUM SERPL-MCNC: 1.9 MG/DL — SIGNIFICANT CHANGE UP (ref 1.6–2.6)
MCHC RBC-ENTMCNC: 31.5 PG — SIGNIFICANT CHANGE UP (ref 27–34)
MCHC RBC-ENTMCNC: 32.1 PG — SIGNIFICANT CHANGE UP (ref 27–34)
MCHC RBC-ENTMCNC: 34.6 GM/DL — SIGNIFICANT CHANGE UP (ref 32–36)
MCHC RBC-ENTMCNC: 34.7 GM/DL — SIGNIFICANT CHANGE UP (ref 32–36)
MCV RBC AUTO: 90.9 FL — SIGNIFICANT CHANGE UP (ref 80–100)
MCV RBC AUTO: 92.6 FL — SIGNIFICANT CHANGE UP (ref 80–100)
MONOCYTES # BLD AUTO: 0.43 K/UL — SIGNIFICANT CHANGE UP (ref 0–0.9)
MONOCYTES NFR BLD AUTO: 4.8 % — SIGNIFICANT CHANGE UP (ref 2–14)
NEUTROPHILS # BLD AUTO: 7.61 K/UL — HIGH (ref 1.8–7.4)
NEUTROPHILS NFR BLD AUTO: 85.8 % — HIGH (ref 43–77)
NRBC # BLD: 0 /100 WBCS — SIGNIFICANT CHANGE UP (ref 0–0)
NRBC # BLD: 0 /100 WBCS — SIGNIFICANT CHANGE UP (ref 0–0)
PCO2 BLDV: 44 MMHG — SIGNIFICANT CHANGE UP (ref 35–50)
PH BLDV: 7.42 — SIGNIFICANT CHANGE UP (ref 7.35–7.45)
PHOSPHATE SERPL-MCNC: 3.2 MG/DL — SIGNIFICANT CHANGE UP (ref 2.5–4.5)
PLATELET # BLD AUTO: 200 K/UL — SIGNIFICANT CHANGE UP (ref 150–400)
PLATELET # BLD AUTO: 218 K/UL — SIGNIFICANT CHANGE UP (ref 150–400)
PO2 BLDV: 40 MMHG — SIGNIFICANT CHANGE UP (ref 25–45)
POTASSIUM BLDV-SCNC: 3.7 MMOL/L — SIGNIFICANT CHANGE UP (ref 3.5–5.3)
POTASSIUM SERPL-MCNC: 3.7 MMOL/L — SIGNIFICANT CHANGE UP (ref 3.5–5.3)
POTASSIUM SERPL-MCNC: 4.2 MMOL/L — SIGNIFICANT CHANGE UP (ref 3.5–5.3)
POTASSIUM SERPL-SCNC: 3.7 MMOL/L — SIGNIFICANT CHANGE UP (ref 3.5–5.3)
POTASSIUM SERPL-SCNC: 4.2 MMOL/L — SIGNIFICANT CHANGE UP (ref 3.5–5.3)
PROT SERPL-MCNC: 6.1 G/DL — SIGNIFICANT CHANGE UP (ref 6–8.3)
PROT SERPL-MCNC: 7.3 G/DL — SIGNIFICANT CHANGE UP (ref 6–8.3)
RBC # BLD: 4.43 M/UL — SIGNIFICANT CHANGE UP (ref 4.2–5.8)
RBC # BLD: 5.04 M/UL — SIGNIFICANT CHANGE UP (ref 4.2–5.8)
RBC # FLD: 13 % — SIGNIFICANT CHANGE UP (ref 10.3–14.5)
RBC # FLD: 13 % — SIGNIFICANT CHANGE UP (ref 10.3–14.5)
SAO2 % BLDV: 72 % — SIGNIFICANT CHANGE UP (ref 67–88)
SODIUM SERPL-SCNC: 140 MMOL/L — SIGNIFICANT CHANGE UP (ref 135–145)
SODIUM SERPL-SCNC: 143 MMOL/L — SIGNIFICANT CHANGE UP (ref 135–145)
TOTAL CHOLESTEROL/HDL RATIO MEASUREMENT: 2.9 RATIO — LOW (ref 3.4–9.6)
TRIGL SERPL-MCNC: 62 MG/DL — SIGNIFICANT CHANGE UP (ref 10–149)
WBC # BLD: 6.96 K/UL — SIGNIFICANT CHANGE UP (ref 3.8–10.5)
WBC # BLD: 8.87 K/UL — SIGNIFICANT CHANGE UP (ref 3.8–10.5)
WBC # FLD AUTO: 6.96 K/UL — SIGNIFICANT CHANGE UP (ref 3.8–10.5)
WBC # FLD AUTO: 8.87 K/UL — SIGNIFICANT CHANGE UP (ref 3.8–10.5)

## 2020-01-21 PROCEDURE — 99222 1ST HOSP IP/OBS MODERATE 55: CPT

## 2020-01-21 PROCEDURE — 74177 CT ABD & PELVIS W/CONTRAST: CPT | Mod: 26

## 2020-01-21 PROCEDURE — 99217: CPT

## 2020-01-21 PROCEDURE — 71045 X-RAY EXAM CHEST 1 VIEW: CPT | Mod: 26

## 2020-01-21 RX ORDER — INSULIN LISPRO 100/ML
VIAL (ML) SUBCUTANEOUS
Refills: 0 | Status: DISCONTINUED | OUTPATIENT
Start: 2020-01-21 | End: 2020-01-21

## 2020-01-21 RX ORDER — PIPERACILLIN AND TAZOBACTAM 4; .5 G/20ML; G/20ML
3.38 INJECTION, POWDER, LYOPHILIZED, FOR SOLUTION INTRAVENOUS ONCE
Refills: 0 | Status: DISCONTINUED | OUTPATIENT
Start: 2020-01-21 | End: 2020-01-21

## 2020-01-21 RX ORDER — DEXTROSE 50 % IN WATER 50 %
15 SYRINGE (ML) INTRAVENOUS ONCE
Refills: 0 | Status: DISCONTINUED | OUTPATIENT
Start: 2020-01-21 | End: 2020-01-23

## 2020-01-21 RX ORDER — DEXTROSE 50 % IN WATER 50 %
12.5 SYRINGE (ML) INTRAVENOUS ONCE
Refills: 0 | Status: DISCONTINUED | OUTPATIENT
Start: 2020-01-21 | End: 2020-01-23

## 2020-01-21 RX ORDER — HEPARIN SODIUM 5000 [USP'U]/ML
5000 INJECTION INTRAVENOUS; SUBCUTANEOUS EVERY 8 HOURS
Refills: 0 | Status: DISCONTINUED | OUTPATIENT
Start: 2020-01-21 | End: 2020-01-21

## 2020-01-21 RX ORDER — METFORMIN HYDROCHLORIDE 850 MG/1
1 TABLET ORAL
Qty: 0 | Refills: 0 | DISCHARGE

## 2020-01-21 RX ORDER — PIPERACILLIN AND TAZOBACTAM 4; .5 G/20ML; G/20ML
3.38 INJECTION, POWDER, LYOPHILIZED, FOR SOLUTION INTRAVENOUS ONCE
Refills: 0 | Status: COMPLETED | OUTPATIENT
Start: 2020-01-21 | End: 2020-01-21

## 2020-01-21 RX ORDER — SODIUM CHLORIDE 9 MG/ML
1000 INJECTION, SOLUTION INTRAVENOUS ONCE
Refills: 0 | Status: COMPLETED | OUTPATIENT
Start: 2020-01-21 | End: 2020-01-21

## 2020-01-21 RX ORDER — SODIUM CHLORIDE 9 MG/ML
1000 INJECTION, SOLUTION INTRAVENOUS
Refills: 0 | Status: DISCONTINUED | OUTPATIENT
Start: 2020-01-21 | End: 2020-01-22

## 2020-01-21 RX ORDER — ACETAMINOPHEN 500 MG
1000 TABLET ORAL ONCE
Refills: 0 | Status: COMPLETED | OUTPATIENT
Start: 2020-01-21 | End: 2020-01-21

## 2020-01-21 RX ORDER — MORPHINE SULFATE 50 MG/1
2 CAPSULE, EXTENDED RELEASE ORAL ONCE
Refills: 0 | Status: DISCONTINUED | OUTPATIENT
Start: 2020-01-21 | End: 2020-01-21

## 2020-01-21 RX ORDER — TETRACAINE/BENZOCAINE/BUTAMBEN 2%-14%-2%
1 OINTMENT (GRAM) TOPICAL EVERY 4 HOURS
Refills: 0 | Status: DISCONTINUED | OUTPATIENT
Start: 2020-01-21 | End: 2020-01-23

## 2020-01-21 RX ORDER — INSULIN LISPRO 100/ML
VIAL (ML) SUBCUTANEOUS EVERY 6 HOURS
Refills: 0 | Status: DISCONTINUED | OUTPATIENT
Start: 2020-01-21 | End: 2020-01-22

## 2020-01-21 RX ORDER — PIPERACILLIN AND TAZOBACTAM 4; .5 G/20ML; G/20ML
3.38 INJECTION, POWDER, LYOPHILIZED, FOR SOLUTION INTRAVENOUS EVERY 8 HOURS
Refills: 0 | Status: COMPLETED | OUTPATIENT
Start: 2020-01-21 | End: 2020-01-22

## 2020-01-21 RX ORDER — ACETAMINOPHEN 500 MG
1000 TABLET ORAL ONCE
Refills: 0 | Status: DISCONTINUED | OUTPATIENT
Start: 2020-01-21 | End: 2020-01-23

## 2020-01-21 RX ORDER — BUPROPION HYDROCHLORIDE 150 MG/1
1 TABLET, EXTENDED RELEASE ORAL
Qty: 0 | Refills: 0 | DISCHARGE

## 2020-01-21 RX ORDER — ONDANSETRON 8 MG/1
4 TABLET, FILM COATED ORAL ONCE
Refills: 0 | Status: COMPLETED | OUTPATIENT
Start: 2020-01-21 | End: 2020-01-21

## 2020-01-21 RX ORDER — SODIUM CHLORIDE 9 MG/ML
1000 INJECTION, SOLUTION INTRAVENOUS
Refills: 0 | Status: DISCONTINUED | OUTPATIENT
Start: 2020-01-21 | End: 2020-01-23

## 2020-01-21 RX ORDER — GLUCAGON INJECTION, SOLUTION 0.5 MG/.1ML
1 INJECTION, SOLUTION SUBCUTANEOUS ONCE
Refills: 0 | Status: DISCONTINUED | OUTPATIENT
Start: 2020-01-21 | End: 2020-01-23

## 2020-01-21 RX ORDER — DEXTROSE 50 % IN WATER 50 %
25 SYRINGE (ML) INTRAVENOUS ONCE
Refills: 0 | Status: DISCONTINUED | OUTPATIENT
Start: 2020-01-21 | End: 2020-01-23

## 2020-01-21 RX ORDER — VILAZODONE HYDROCHLORIDE 20 MG/1
1 TABLET, FILM COATED ORAL
Qty: 0 | Refills: 0 | DISCHARGE

## 2020-01-21 RX ORDER — ROSUVASTATIN CALCIUM 5 MG/1
1 TABLET ORAL
Qty: 0 | Refills: 0 | DISCHARGE

## 2020-01-21 RX ORDER — ENOXAPARIN SODIUM 100 MG/ML
40 INJECTION SUBCUTANEOUS DAILY
Refills: 0 | Status: DISCONTINUED | OUTPATIENT
Start: 2020-01-21 | End: 2020-01-23

## 2020-01-21 RX ORDER — BENZOCAINE AND MENTHOL 5; 1 G/100ML; G/100ML
1 LIQUID ORAL
Refills: 0 | Status: DISCONTINUED | OUTPATIENT
Start: 2020-01-21 | End: 2020-01-23

## 2020-01-21 RX ADMIN — SODIUM CHLORIDE 150 MILLILITER(S): 9 INJECTION, SOLUTION INTRAVENOUS at 08:37

## 2020-01-21 RX ADMIN — PIPERACILLIN AND TAZOBACTAM 25 GRAM(S): 4; .5 INJECTION, POWDER, LYOPHILIZED, FOR SOLUTION INTRAVENOUS at 21:08

## 2020-01-21 RX ADMIN — BENZOCAINE AND MENTHOL 1 LOZENGE: 5; 1 LIQUID ORAL at 15:30

## 2020-01-21 RX ADMIN — SODIUM CHLORIDE 1000 MILLILITER(S): 9 INJECTION, SOLUTION INTRAVENOUS at 11:32

## 2020-01-21 RX ADMIN — ONDANSETRON 4 MILLIGRAM(S): 8 TABLET, FILM COATED ORAL at 03:50

## 2020-01-21 RX ADMIN — ONDANSETRON 4 MILLIGRAM(S): 8 TABLET, FILM COATED ORAL at 07:05

## 2020-01-21 RX ADMIN — SODIUM CHLORIDE 150 MILLILITER(S): 9 INJECTION, SOLUTION INTRAVENOUS at 07:03

## 2020-01-21 RX ADMIN — ENOXAPARIN SODIUM 40 MILLIGRAM(S): 100 INJECTION SUBCUTANEOUS at 16:39

## 2020-01-21 RX ADMIN — MORPHINE SULFATE 2 MILLIGRAM(S): 50 CAPSULE, EXTENDED RELEASE ORAL at 03:44

## 2020-01-21 RX ADMIN — SODIUM CHLORIDE 1000 MILLILITER(S): 9 INJECTION, SOLUTION INTRAVENOUS at 08:00

## 2020-01-21 RX ADMIN — SODIUM CHLORIDE 150 MILLILITER(S): 9 INJECTION, SOLUTION INTRAVENOUS at 15:33

## 2020-01-21 RX ADMIN — Medication 400 MILLIGRAM(S): at 17:31

## 2020-01-21 RX ADMIN — Medication 1000 MILLIGRAM(S): at 18:03

## 2020-01-21 RX ADMIN — PIPERACILLIN AND TAZOBACTAM 200 GRAM(S): 4; .5 INJECTION, POWDER, LYOPHILIZED, FOR SOLUTION INTRAVENOUS at 11:32

## 2020-01-21 NOTE — ED ADULT NURSE REASSESSMENT NOTE - GASTROINTESTINAL WDL
Abdomen soft, nontender, distended, bowel sounds sluggish
Abdomen soft, nontender, nondistended, bowel sounds present in all 4 quadrants.

## 2020-01-21 NOTE — H&P ADULT - ASSESSMENT
58yo M with PMH/PSH of HTN, HLD pre-DM, s/p umbilical hernia repair and ?lap prostate sx at OSH now a/w SBO.     -admit to ACS: Dr. Yancey  -NPO  -LR @ 100 + 1L Bolus  -NGT to LWS  -serial abd exams  -limit narcotics  -ISS for glucose coverage  -OOB and ambulate  -monitor GI fxn  -DVT PPX: SCD's and SQH    above d/w Dr. Yancey  *[phone]

## 2020-01-21 NOTE — ED CDU PROVIDER SUBSEQUENT DAY NOTE - PROGRESS NOTE DETAILS
CDU PROGRESS NOTE PA NICK: CT abdomen and pelvis shows small bowel obstruction with transition pt in the LUQ (2:51)-no pneumatosis, no free air. Surgery consulted. CDU PROGRESS NOTE CARLOZ ROMERO: Surgery to place NG tube, c/d/w Dr. Dasilva, patient will likely require admission. Stress test cancelled. CDU PROGRESS NOTE PA NICK: Surgery at bedside CDU PROGRESS NOTE PA NICK: Surgery successful placement of NG tube. Pending X ray for confirmation. Patient will need admission to surgery. CDU NOTE CARLOZ Espinal: as per Surgery PA- will admit to the Surgery service. received call from lab that lactate elevated at 2.1, informed Surgery PA of result. NG tube is in place and suction connected.

## 2020-01-21 NOTE — H&P ADULT - NSHPPHYSICALEXAM_GEN_ALL_CORE
GEN: NAD, A+Ox3  HEENT: conjunctiva clear, EOMI  Neck: supple, trachea midline  CV: RRR  Resp: non labored  Abd: softly distended, NT. No rebound or guarding. NGT in place with bilious output  Skin: warm, dry  Ext: b/l calf soft, NT. No pedal edema b/l  Neuro: no focal deficits

## 2020-01-21 NOTE — H&P ADULT - NSICDXPASTSURGICALHX_GEN_ALL_CORE_FT
PAST SURGICAL HISTORY:  History of robot-assisted laparoscopic radical prostatectomy     Umbilical hernia

## 2020-01-21 NOTE — H&P ADULT - ATTENDING COMMENTS
Patient seen and examined.   59 year old male with PMH significant for DM, HTN, HLD, umbilical hernia repair & lap prostatectomy at [facility] by Dr. Negrete 3 years prior who presents with nausea, abdominal distention and pain. CT scan demonstrates proximal SBO with a loop of jejunum with pneumatosis. The patient states his abdominal exam has improved since NGT placement and he is not currently experiencing pain. He is passing flatus as well. Hemodynamically stable at this time. Denies chest pain or dyspnea.   On exam he is nontender, mildly distended with no rebound or guarding.   labs reviewed  WBC improved on repeat labs to 8.87  Lactate increased after emesis to 2.1  I have reviewed the imaging.    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  Patient given 2 more liters iVF and plan for repeat labs at noon.  Started on zosyn for plan of 24 hours  Will keep NPO with IVF and serial abdominal exams    I have discussed with the patient that if he experiences pain or an increase in lactate he needs an exploration as there is concern for continued ischemia. Given his benign abdominal exam I believe it is safe to continue to monitor him with serial abdominal exams. I discussed with him not to take pain medication as pain would be a sign of worsening ischemia and necessitate going to the OR. He understands the care management plan.

## 2020-01-21 NOTE — ED ADULT NURSE REASSESSMENT NOTE - NS ED NURSE REASSESS COMMENT FT1
07.00 Am  Pt received from BRYSON Jacob. Pt is observed for CP  for  epigastric pain  For Nuclear stress test  ,Pt denies any chest pain, SOB, dizziness palpitations N/V/D fever chills as of now.  Pt resting in bed. Safety & comfort measures maintained. Call bell in reach. IV site looks clean & dry no infiltration  Will continue to monitor.
15.30 Pt feeling better had no N/V/D after the NG tube on Low wall suction.  Abdomen is soft non tender pt states he is passing flatulence per rectum pt looks very comfortable  & relaxed  Pt stable to go to floor has bed in 2 Serafin Report given  to Boyd Hurtado. Pt stable to go to floor
NG tube placed by surgery team. Pending placement xray. Safety maintained. Will continue to monitor.
Patient reports indigestion and nausea "starting to come back". Provider Kaushik aware. Medication given as ordered.
Pt received from BRYSON Chadwick. Pt oriented to CDU & plan of care was discussed. Pt complaining of 5/10 epigastric pain. Pt states he had no pain prior to eating but states once he ate this pain started. Bleching and nausea associated with discomfort. EKG done at bedside. Medicated as per MAR. Safety & comfort measures maintained. Call bell in reach. Will continue to monitor.
07.00 Am Received the pt from   Boyd Hernadez . Pt is observed for epigastric pain . Pt found to have SBO Surgical resident @ bedside & placing the pt on NG tube which is connected to wall suction . pt vomited  & NG tube draining bile colored stomach content. Pt states he feels better S/P nausea vomiting  & abdominal distention  Pt is A&OX 4  Pt denies fever chills Cp sob headache dizziness  has stable vitals comfort care & safety measures are maintained  IV site looks clean & dry no infiltration noted  call bell with in the reach   Continue to monitor. pt is admitted awaiting for the bed

## 2020-01-21 NOTE — H&P ADULT - NSHPREVIEWOFSYSTEMS_GEN_ALL_CORE
Gen: denies fevers, chills  HEENT: denies vision or hearing changes  Resp: denies SOB, cough  CV: denies palpitations, CP  Abd: admits to abd pain, nausea, vomiting  : denies frequency, hematuria  Skin: denies pruritus, lesions  Ext: denies swelling  Neuro: denies weakness, dizziness  Endo: denies heat or cold intolerance

## 2020-01-21 NOTE — ED CDU PROVIDER SUBSEQUENT DAY NOTE - ENMT NEGATIVE STATEMENT, MLM
Ears: no ear pain and no hearing problems.Nose: no nasal congestion and no nasal drainage.Mouth/Throat: no dysphagia, no hoarseness and no throat pain.Neck: no lumps, no pain, no stiffness and no swollen glands
ABR (auditory brainstem response)

## 2020-01-21 NOTE — CHART NOTE - NSCHARTNOTEFT_GEN_A_CORE
58yo M with PMH/PSH of HTN, HLD pre-DM, s/p umbilical hernia repair and lap prostate with an SBO     Patient seen and examined at bedside. Recently came back from walk with his wife. NGT in place.   Denies nausea, vomiting, and abdominal pain. Endorses sore throat 2/2 NGT.    Pt states that the mid-epigastric abdominal pain he experience prior to admission has resolved since placement of the NGT.     Patient is passing gas, but has not yet had a BM.     Vital Signs Last 24 Hrs  T(C): 36.7 (21 Jan 2020 20:55), Max: 37 (21 Jan 2020 17:09)  T(F): 98.1 (21 Jan 2020 20:55), Max: 98.6 (21 Jan 2020 17:09)  HR: 72 (21 Jan 2020 20:55) (72 - 84)  BP: 130/72 (21 Jan 2020 20:55) (130/72 - 155/71)  BP(mean): --  RR: 18 (21 Jan 2020 20:55) (18 - 19)  SpO2: 96% (21 Jan 2020 20:55) (95% - 98%)    Gen: NAD  Resp: No increased WOB   Abd: soft, non-tender, non-distended  Ext: moving all 4 ext spontaneously     Plan   - no pain medication without exam   - continue OOB   - monitor NGT output     Adrianne Mo   [id number]

## 2020-01-21 NOTE — ED CDU PROVIDER SUBSEQUENT DAY NOTE - PHYSICAL EXAMINATION
GEN: Pt in NAD, A&O x3.  PSYCH: Affect appropriate.  EYES: Sclera white w/o injection.   ENT: Head NCAT. Nose w/o deformity. No auricular TTP. MMM. Neck supple FROM.   RESP: No chest wall tenderness, CTA b/l, no wheezes, rales, or rhonchi.   CARDIAC: RRR, clear distinct S1, S2, no appreciable murmurs.  ABD: Abdomen without obvious deformity. Abdomen soft, mild tenderness to palpation in the epigastric area, negative Rodriguez's sign, no rebound or guarding. No CVAT b/l.  VASC: 2+ radial and dorsalis pedis pulses b/l. No edema or calf tenderness.  SKIN: No rashes on the trunk.

## 2020-01-21 NOTE — ED CDU PROVIDER SUBSEQUENT DAY NOTE - HISTORY
CDU PROGRESS NOTE PA NICK: Pt c/o worsening 5/10 epigastric abdominal pain, non radiating. Pt denies nausea, diarrhea, constipation, back pain or urinary symptoms. Abdomen soft, (+) TTP epigastrim, (-) rebound, (-) guarding, (-) Rodriguez's sign. US abdomen showed Cholelithiasis without evidence of cholecystitis. Hepatomegaly and hepatic steatosis. Normal CBD measuring at 3mm. c/d/w Dr. Landeros, will treat Morphine 2mg IVP and consult Surgery.

## 2020-01-21 NOTE — H&P ADULT - NSHPLABSRESULTS_GEN_ALL_CORE
< from: US Abdomen Complete (01.20.20 @ 23:02) >    FINDINGS:    Liver: Increased echogenicity compatible with fatty infiltration. Enlarged measuring up to 19.0 cm.    Bile ducts: Normal caliber. Common bile duct measures 3 mm.     Gallbladder: Cholelithiasis. No pericholecystic fluid, wall thickening or sonographic Rodriguez's sign.    Pancreas: Not well visualized.    Spleen: 13.5 cm.     Right kidney: 10.9 cm. No hydronephrosis. Simple interpolar cyst measuring 1.1 cm.    Left kidney: 13.4 cm. No hydronephrosis.     Ascites: None.    Aorta and IVC: Visualized portions are within normal limits.    IMPRESSION:     1. Cholelithiasis without evidence of cholecystitis.  2. Hepatomegaly and hepatic steatosis.      < end of copied text >          < from: CT Abdomen and Pelvis w/ IV Cont (01.21.20 @ 05:21) >    BLADDER: Within normal limits.  REPRODUCTIVE ORGANS: Prostate and seminal vesicles appear unremarkable.    BOWEL: Fluid-filled distention of stomach and proximal jejunum, with transition to nondilated bowel in the left upper quadrant (2, 50) consistent with mechanical small bowel obstruction. Focal pneumatosis in proximal jejunal loop, best seen on coronal image (602, 36). Several high attenuation filling defects within the dilated proximal jejunum, polyps versus intraluminal bowel content. Appendix normal.  PERITONEUM: No ascites.  VESSELS: Mild atheromatous calcifications.  RETROPERITONEUM/LYMPH NODES: No lymphadenopathy.    ABDOMINAL WALL: Within normal limits.  BONES: Within normal limits.    IMPRESSION:     Small bowel obstruction in the proximal jejunum.    Focal jejunal pneumatosis without other evidence of bowel ischemia.    Suspicion of jejunal polyps.    < end of copied text >

## 2020-01-21 NOTE — H&P ADULT - HISTORY OF PRESENT ILLNESS
60yo M with PMH/PSH of DM, HTN, HLD, umbilical hernia repair & lap prostatectomy at OSH now a/w SBO. Patient began having upper abd pain yesterday morning around 6am. He was able to make it to work but pain worsened and states he had to go home. He tried pain medicine with no alleviation and came to ER for evaluation yesterday. Upon ER evaluation was noted to have cholelithiasis on US and thought to have pain 2/2 biliary colic. He was given dinner and pain immediately worsened with associated nausea/vomiting. Last flatus yesterday and last BM 2 days ago. CT scan performed and revealed SBO with transition point in LUQ. NGT inserted this am in ER and patient notes alleviation of pain, nausea and vomiting. Denies fever, chills, CP, SOB.

## 2020-01-21 NOTE — ED CDU PROVIDER SUBSEQUENT DAY NOTE - MEDICAL DECISION MAKING DETAILS
59 M w/ PMH of HLD, HTN Depression and recent MRI of the abdomen 2 weeks ago, found to have pneumatosis of the jejunum and SBO seen by sx w/ NG tube placed. Pt currently w/ improvement of abd pain on my exam at 934 AM. Has NG tube in place, abd w/ no tenderness, hypoactive bowel sounds in all 4 quadrants. Plan to discuss antibiotics w/ sx. Family and pt updated

## 2020-01-22 ENCOUNTER — TRANSCRIPTION ENCOUNTER (OUTPATIENT)
Age: 60
End: 2020-01-22

## 2020-01-22 LAB
ANION GAP SERPL CALC-SCNC: 14 MMOL/L — SIGNIFICANT CHANGE UP (ref 5–17)
BUN SERPL-MCNC: 12 MG/DL — SIGNIFICANT CHANGE UP (ref 7–23)
CALCIUM SERPL-MCNC: 8.7 MG/DL — SIGNIFICANT CHANGE UP (ref 8.4–10.5)
CHLORIDE SERPL-SCNC: 102 MMOL/L — SIGNIFICANT CHANGE UP (ref 96–108)
CO2 SERPL-SCNC: 25 MMOL/L — SIGNIFICANT CHANGE UP (ref 22–31)
CREAT SERPL-MCNC: 0.86 MG/DL — SIGNIFICANT CHANGE UP (ref 0.5–1.3)
GLUCOSE BLDC GLUCOMTR-MCNC: 104 MG/DL — HIGH (ref 70–99)
GLUCOSE BLDC GLUCOMTR-MCNC: 82 MG/DL — SIGNIFICANT CHANGE UP (ref 70–99)
GLUCOSE BLDC GLUCOMTR-MCNC: 84 MG/DL — SIGNIFICANT CHANGE UP (ref 70–99)
GLUCOSE BLDC GLUCOMTR-MCNC: 91 MG/DL — SIGNIFICANT CHANGE UP (ref 70–99)
GLUCOSE BLDC GLUCOMTR-MCNC: 95 MG/DL — SIGNIFICANT CHANGE UP (ref 70–99)
GLUCOSE SERPL-MCNC: 98 MG/DL — SIGNIFICANT CHANGE UP (ref 70–99)
HCT VFR BLD CALC: 40.9 % — SIGNIFICANT CHANGE UP (ref 39–50)
HCV AB S/CO SERPL IA: 0.2 S/CO — SIGNIFICANT CHANGE UP (ref 0–0.99)
HCV AB SERPL-IMP: SIGNIFICANT CHANGE UP
HGB BLD-MCNC: 13.8 G/DL — SIGNIFICANT CHANGE UP (ref 13–17)
MAGNESIUM SERPL-MCNC: 1.8 MG/DL — SIGNIFICANT CHANGE UP (ref 1.6–2.6)
MCHC RBC-ENTMCNC: 31.9 PG — SIGNIFICANT CHANGE UP (ref 27–34)
MCHC RBC-ENTMCNC: 33.7 GM/DL — SIGNIFICANT CHANGE UP (ref 32–36)
MCV RBC AUTO: 94.5 FL — SIGNIFICANT CHANGE UP (ref 80–100)
PHOSPHATE SERPL-MCNC: 2.7 MG/DL — SIGNIFICANT CHANGE UP (ref 2.5–4.5)
PLATELET # BLD AUTO: 173 K/UL — SIGNIFICANT CHANGE UP (ref 150–400)
POTASSIUM SERPL-MCNC: 4 MMOL/L — SIGNIFICANT CHANGE UP (ref 3.5–5.3)
POTASSIUM SERPL-SCNC: 4 MMOL/L — SIGNIFICANT CHANGE UP (ref 3.5–5.3)
RBC # BLD: 4.33 M/UL — SIGNIFICANT CHANGE UP (ref 4.2–5.8)
RBC # FLD: 13.1 % — SIGNIFICANT CHANGE UP (ref 10.3–14.5)
SODIUM SERPL-SCNC: 141 MMOL/L — SIGNIFICANT CHANGE UP (ref 135–145)
WBC # BLD: 5.69 K/UL — SIGNIFICANT CHANGE UP (ref 3.8–10.5)
WBC # FLD AUTO: 5.69 K/UL — SIGNIFICANT CHANGE UP (ref 3.8–10.5)

## 2020-01-22 PROCEDURE — 99232 SBSQ HOSP IP/OBS MODERATE 35: CPT

## 2020-01-22 RX ORDER — INSULIN LISPRO 100/ML
VIAL (ML) SUBCUTANEOUS AT BEDTIME
Refills: 0 | Status: DISCONTINUED | OUTPATIENT
Start: 2020-01-22 | End: 2020-01-23

## 2020-01-22 RX ORDER — INSULIN LISPRO 100/ML
VIAL (ML) SUBCUTANEOUS
Refills: 0 | Status: DISCONTINUED | OUTPATIENT
Start: 2020-01-22 | End: 2020-01-23

## 2020-01-22 RX ADMIN — ENOXAPARIN SODIUM 40 MILLIGRAM(S): 100 INJECTION SUBCUTANEOUS at 12:11

## 2020-01-22 RX ADMIN — PIPERACILLIN AND TAZOBACTAM 25 GRAM(S): 4; .5 INJECTION, POWDER, LYOPHILIZED, FOR SOLUTION INTRAVENOUS at 05:02

## 2020-01-22 RX ADMIN — SODIUM CHLORIDE 150 MILLILITER(S): 9 INJECTION, SOLUTION INTRAVENOUS at 05:02

## 2020-01-22 NOTE — PROGRESS NOTE ADULT - ATTENDING COMMENTS
seen and examined 01-22-20 @ 0920    2016 @ [facility] - robotic assisted laparoscopic radical prostatectomy and umbilical hernia repair    RRR  CTA bilat  soft / NT / ND  no pedal edema    SBO from postop adhesions seems resolved  -if minimal residual after clamp trial, ok to remove NG tube and advance diet as tolerated seen and examined 01-22-20 @ 0920    2016 @ [facility] - robotic assisted laparoscopic radical prostatectomy and umbilical hernia repair    RRR w/o murmurs  CTA bilat  soft / NT / ND  no pedal edema    SBO from postop adhesions seems resolved  -if minimal residual after clamp trial, ok to remove NG tube and advance diet as tolerated

## 2020-01-22 NOTE — PROGRESS NOTE ADULT - SUBJECTIVE AND OBJECTIVE BOX
S: Pt seen and examined.                MEDICATIONS  (STANDING):  acetaminophen  IVPB .. 1000 milliGRAM(s) IV Intermittent once  dextrose 5%. 1000 milliLiter(s) (50 mL/Hr) IV Continuous <Continuous>  dextrose 50% Injectable 12.5 Gram(s) IV Push once  dextrose 50% Injectable 25 Gram(s) IV Push once  dextrose 50% Injectable 25 Gram(s) IV Push once  enoxaparin Injectable 40 milliGRAM(s) SubCutaneous daily  insulin lispro (HumaLOG) corrective regimen sliding scale   SubCutaneous every 6 hours  lactated ringers. 1000 milliLiter(s) (150 mL/Hr) IV Continuous <Continuous>    MEDICATIONS  (PRN):  benzocaine 15 mG/menthol 3.6 mG (Sugar-Free) Lozenge 1 Lozenge Oral every 3 hours PRN Sore Throat  dextrose 40% Gel 15 Gram(s) Oral once PRN Blood Glucose LESS THAN 70 milliGRAM(s)/deciliter  glucagon  Injectable 1 milliGRAM(s) IntraMuscular once PRN Glucose LESS THAN 70 milligrams/deciliter  tetracaine/benzocaine/butamben Spray 1 Spray(s) Topical every 4 hours PRN throat pain      LABS:                        14.2   6.96  )-----------( 200      ( 21 Jan 2020 14:48 )             41.0     01-21    143  |  105  |  15  ----------------------------<  108<H>  3.7   |  26  |  0.88    Ca    9.1      21 Jan 2020 14:48  Phos  3.2     01-21  Mg     1.9     01-21    TPro  6.1  /  Alb  3.6  /  TBili  1.5<H>  /  DBili  x   /  AST  24  /  ALT  34  /  AlkPhos  49  01-21    PT/INR - ( 20 Jan 2020 13:08 )   PT: 12.3 sec;   INR: 1.07 ratio         PTT - ( 20 Jan 2020 13:08 )  PTT:29.8 sec        Vital Signs Last 24 Hrs  T(C): 36.8 (22 Jan 2020 05:33), Max: 37 (21 Jan 2020 17:09)  T(F): 98.3 (22 Jan 2020 05:33), Max: 98.6 (21 Jan 2020 17:09)  HR: 70 (22 Jan 2020 05:33) (69 - 84)  BP: 156/77 (22 Jan 2020 05:33) (130/72 - 156/77)  BP(mean): --  RR: 18 (22 Jan 2020 05:33) (18 - 19)  SpO2: 96% (22 Jan 2020 05:33) (96% - 98%)      I&O's Summary    21 Jan 2020 07:01  -  22 Jan 2020 06:50  --------------------------------------------------------  IN: 2100 mL / OUT: 1075 mL / NET: 1025 mL      I&O's Detail    21 Jan 2020 07:01  -  22 Jan 2020 06:50  --------------------------------------------------------  IN:    lactated ringers.: 1800 mL    Solution: 300 mL  Total IN: 2100 mL    OUT:    Nasoenteral Tube: 200 mL    Voided: 875 mL  Total OUT: 1075 mL    Total NET: 1025 mL         GEN: NAD, A+Ox3  	HEENT: conjunctiva clear, EOMI  	Neck: supple, trachea midline  	CV: RRR  	Resp: non labored  	Abd: softly distended, NT. No rebound or guarding. NGT in place with bilious output  	Skin: warm, dry  	Ext: b/l calf soft, NT. No pedal edema b/l  Neuro: no focal deficits  .  .  .  .  . S: Pt seen and examined. REports flatus, no BM Denies N/V/ abd pain is improved. Denies F/C/NS.  Denies CP/SOB/dyspnea/palpitations.      MEDICATIONS  (STANDING):  acetaminophen  IVPB .. 1000 milliGRAM(s) IV Intermittent once  dextrose 5%. 1000 milliLiter(s) (50 mL/Hr) IV Continuous <Continuous>  dextrose 50% Injectable 12.5 Gram(s) IV Push once  dextrose 50% Injectable 25 Gram(s) IV Push once  dextrose 50% Injectable 25 Gram(s) IV Push once  enoxaparin Injectable 40 milliGRAM(s) SubCutaneous daily  insulin lispro (HumaLOG) corrective regimen sliding scale   SubCutaneous every 6 hours  lactated ringers. 1000 milliLiter(s) (150 mL/Hr) IV Continuous <Continuous>    MEDICATIONS  (PRN):  benzocaine 15 mG/menthol 3.6 mG (Sugar-Free) Lozenge 1 Lozenge Oral every 3 hours PRN Sore Throat  dextrose 40% Gel 15 Gram(s) Oral once PRN Blood Glucose LESS THAN 70 milliGRAM(s)/deciliter  glucagon  Injectable 1 milliGRAM(s) IntraMuscular once PRN Glucose LESS THAN 70 milligrams/deciliter  tetracaine/benzocaine/butamben Spray 1 Spray(s) Topical every 4 hours PRN throat pain      LABS:                        14.2   6.96  )-----------( 200      ( 21 Jan 2020 14:48 )             41.0     01-21    143  |  105  |  15  ----------------------------<  108<H>  3.7   |  26  |  0.88    Ca    9.1      21 Jan 2020 14:48  Phos  3.2     01-21  Mg     1.9     01-21    TPro  6.1  /  Alb  3.6  /  TBili  1.5<H>  /  DBili  x   /  AST  24  /  ALT  34  /  AlkPhos  49  01-21    PT/INR - ( 20 Jan 2020 13:08 )   PT: 12.3 sec;   INR: 1.07 ratio         PTT - ( 20 Jan 2020 13:08 )  PTT:29.8 sec        Vital Signs Last 24 Hrs  T(C): 36.8 (22 Jan 2020 05:33), Max: 37 (21 Jan 2020 17:09)  T(F): 98.3 (22 Jan 2020 05:33), Max: 98.6 (21 Jan 2020 17:09)  HR: 70 (22 Jan 2020 05:33) (69 - 84)  BP: 156/77 (22 Jan 2020 05:33) (130/72 - 156/77)  BP(mean): --  RR: 18 (22 Jan 2020 05:33) (18 - 19)  SpO2: 96% (22 Jan 2020 05:33) (96% - 98%)      I&O's Summary    21 Jan 2020 07:01  -  22 Jan 2020 06:50  --------------------------------------------------------  IN: 2100 mL / OUT: 1075 mL / NET: 1025 mL      I&O's Detail    21 Jan 2020 07:01  -  22 Jan 2020 06:50  --------------------------------------------------------  IN:    lactated ringers.: 1800 mL    Solution: 300 mL  Total IN: 2100 mL    OUT:    Nasoenteral Tube: 200 mL    Voided: 875 mL  Total OUT: 1075 mL    Total NET: 1025 mL         GEN: NAD, A+Ox3  	HEENT: conjunctiva clear, EOMI  	Neck: supple, trachea midline,  NGT in place with bilious output   Chest non labored  	Abd: softly distended, NT. No rebound or guarding.  	Skin: warm, dry  	Ext: b/l calf soft, NT. No pedal edema b/l  Neuro: no focal deficits  .  .  .  .  .

## 2020-01-22 NOTE — PROGRESS NOTE ADULT - ASSESSMENT
60yo M with PMH/PSH of HTN, HLD pre-DM, s/p umbilical hernia repair and ?lap prostate sx at OSH now a/w SBO.       -NPO  -LR @ 100 + 1L Bolus  -NGT to LWS  -serial abd exams  -limit narcotics  -ISS for glucose coverage  -OOB and ambulate  -monitor GI fxn  -DVT PPX: SCD's and SQH      *[id number] 60yo M with PMH/PSH of HTN, HLD pre-DM, s/p umbilical hernia repair and ?lap prostate sx at OSH now a/w SBO.       -NPO  -LR @ 100 + 1L Bolus  -NGT clamp.  -serial abd exams  -limit narcotics  -ISS for glucose coverage  -OOB and ambulate  -monitor GI fxn  -DVT PPX: SCD's and SQH      *[id number]

## 2020-01-22 NOTE — DISCHARGE NOTE PROVIDER - CARE PROVIDER_API CALL
Lesly Yancey (MD)  Surgery; Surgical Critical Care  [address] [facility], [address] [address]  Phone: [phone]  Fax: [phone]  Follow Up Time: 2 weeks Raj Gamez (MD)  Surgery; Surgical Critical Care  [address] [facility], [address], [place] [id number]  Phone: [phone]  Fax: [phone]  Follow Up Time:

## 2020-01-22 NOTE — DISCHARGE NOTE PROVIDER - NSDCFUSCHEDAPPT_GEN_ALL_CORE_FT
ARLINE NUNN ; 03/31/2020 ; [facility] [address] ARLINE NUNN ; 03/31/2020 ; [facility] [address] ARLINE NUNN ; 03/31/2020 ; [facility] [address] ARLINE NUNN ; 03/31/2020 ; [facility] [address]

## 2020-01-22 NOTE — DISCHARGE NOTE PROVIDER - CARE PROVIDERS DIRECT ADDRESSES
[email or web address]

## 2020-01-22 NOTE — DISCHARGE NOTE PROVIDER - HOSPITAL COURSE
58yo M with PMH/PSH of DM, HTN, HLD, umbilical hernia repair & lap prostatectomy at OSH now p/w SBO. Patient began having upper abd pain yesterday morning around 6am. He was able to make it to work but pain worsened and states he had to go home. He tried pain medicine with no alleviation and came to ER for evaluation yesterday. Upon ER evaluation was noted to have cholelithiasis on US and thought to have pain 2/2 biliary colic. He was given dinner and pain immediately worsened with associated nausea/vomiting. Last flatus yesterday and last BM 2 days ago. CT scan performed and revealed SBO with transition point in LUQ. NGT inserted this am in ER and patient notes alleviation of pain, nausea and vomiting. Denies fever, chills, CP, SOB.         HD#1 Pt was passing gas and passed NGT clamp trial and NGT removed and started on clears, then advanced to solids as tolerated.         At time of discharge, the patient was hemodynamically stable, was tolerating PO diet, was voiding urine and passing stool, was ambulating, and was comfortable with adequate pain control. The patient was instructed to follow up with Dr. Yancey within 1-2 weeks after discharge from the hospital. The patient/family felt comfortable with discharge. The patient was discharged to home. The patient had no other issues and was recovering appropriately. 60yo M with PMH/PSH of DM, HTN, HLD, umbilical hernia repair & lap prostatectomy at OSH now p/w SBO. Patient began having upper abd pain yesterday morning around 6am. He was able to make it to work but pain worsened and states he had to go home. He tried pain medicine with no alleviation and came to ER for evaluation yesterday. Upon ER evaluation was noted to have cholelithiasis on US and thought to have pain 2/2 biliary colic. He was given dinner and pain immediately worsened with associated nausea/vomiting. Last flatus yesterday and last BM 2 days ago. CT scan performed and revealed SBO with transition point in LUQ. NGT inserted this am in ER and patient notes alleviation of pain, nausea and vomiting. Denies fever, chills, CP, SOB.         HD#1 Pt was passing gas and passed NGT clamp trial. NGT was removed and started on clears, then advanced to solids as tolerated.         At time of discharge, the patient was hemodynamically stable, was tolerating PO diet, was voiding urine and passing stool, was ambulating, and was comfortable with adequate pain control. The patient was instructed to follow up with Dr. Yancey within 1-2 weeks after discharge from the hospital. The patient/family felt comfortable with discharge. The patient was discharged to home. The patient had no other issues and was recovering appropriately. 60yo M with PMH/PSH of DM, HTN, HLD, umbilical hernia repair & lap prostatectomy at OSH now p/w SBO. Patient began having upper abd pain yesterday morning around 6am. He was able to make it to work but pain worsened and states he had to go home. He tried pain medicine with no alleviation and came to ER for evaluation yesterday. Upon ER evaluation was noted to have cholelithiasis on US and thought to have pain 2/2 biliary colic. He was given dinner and pain immediately worsened with associated nausea/vomiting. Last flatus yesterday and last BM 2 days ago. CT scan performed and revealed SBO with transition point in LUQ. NGT inserted this am in ER and patient notes alleviation of pain, nausea and vomiting. Denies fever, chills, CP, SOB.         HD#1 Pt was passing gas and passed NGT clamp trial. NGT was removed and started on clears, then advanced to solids as tolerated.         At time of discharge, the patient was hemodynamically stable, was tolerating PO diet, was voiding urine and passing stool, was ambulating, and was comfortable with adequate pain control. The patient felt comfortable with discharge. The patient was discharged to home. The patient had no other issues and was recovering appropriately.

## 2020-01-22 NOTE — DISCHARGE NOTE PROVIDER - NSDCCPCAREPLAN_GEN_ALL_CORE_FT
PRINCIPAL DISCHARGE DIAGNOSIS  Diagnosis: SBO (small bowel obstruction)  Assessment and Plan of Treatment:   NOTIFY YOUR SURGEON IF: You have any bleeding that does not stop, any pus draining from your wound, any fever (over 100.4 F) or chills, persistent nausea/vomiting with inability to tolerate food or liquids, persistent diarrhea, or if your pain is not controlled on your discharge pain medications.  FOLLOW-UP:  1. Please call to make a follow-up appointment within 1-2 weeks of discharge   2. Please follow up with your primary care physician in 1-2 weeksregarding your hospitalization. PRINCIPAL DISCHARGE DIAGNOSIS  Diagnosis: SBO (small bowel obstruction)  Assessment and Plan of Treatment: Resolution of Symptoms

## 2020-01-22 NOTE — DISCHARGE NOTE PROVIDER - NSDCMRMEDTOKEN_GEN_ALL_CORE_FT
buPROPion 150 mg/24 hours (XL) oral tablet, extended release: 1 tab(s) orally every 24 hours  Crestor 10 mg oral tablet: 1 tab(s) orally once a day  metFORMIN 500 mg oral tablet: 1 tab(s) orally 2 times a day  Viibryd 20 mg oral tablet: 1 tab(s) orally once a day

## 2020-01-22 NOTE — DISCHARGE NOTE PROVIDER - NSDCFUADDAPPT_GEN_ALL_CORE_FT
Please follow up with your Hosptial Doctor (Dr Gamez) only if needed at [address] [place] [address] [address], [place] [address] , phone #[phone].

## 2020-01-23 ENCOUNTER — TRANSCRIPTION ENCOUNTER (OUTPATIENT)
Age: 60
End: 2020-01-23

## 2020-01-23 VITALS
TEMPERATURE: 99 F | OXYGEN SATURATION: 95 % | RESPIRATION RATE: 18 BRPM | DIASTOLIC BLOOD PRESSURE: 71 MMHG | SYSTOLIC BLOOD PRESSURE: 123 MMHG | HEART RATE: 75 BPM

## 2020-01-23 LAB — GLUCOSE BLDC GLUCOMTR-MCNC: 92 MG/DL — SIGNIFICANT CHANGE UP (ref 70–99)

## 2020-01-23 PROCEDURE — 80053 COMPREHEN METABOLIC PANEL: CPT

## 2020-01-23 PROCEDURE — 84100 ASSAY OF PHOSPHORUS: CPT

## 2020-01-23 PROCEDURE — 83735 ASSAY OF MAGNESIUM: CPT

## 2020-01-23 PROCEDURE — 96374 THER/PROPH/DIAG INJ IV PUSH: CPT

## 2020-01-23 PROCEDURE — 86803 HEPATITIS C AB TEST: CPT

## 2020-01-23 PROCEDURE — 83605 ASSAY OF LACTIC ACID: CPT

## 2020-01-23 PROCEDURE — G0378: CPT

## 2020-01-23 PROCEDURE — 84132 ASSAY OF SERUM POTASSIUM: CPT

## 2020-01-23 PROCEDURE — 82330 ASSAY OF CALCIUM: CPT

## 2020-01-23 PROCEDURE — 85730 THROMBOPLASTIN TIME PARTIAL: CPT

## 2020-01-23 PROCEDURE — 84484 ASSAY OF TROPONIN QUANT: CPT

## 2020-01-23 PROCEDURE — 83690 ASSAY OF LIPASE: CPT

## 2020-01-23 PROCEDURE — 96376 TX/PRO/DX INJ SAME DRUG ADON: CPT

## 2020-01-23 PROCEDURE — 80048 BASIC METABOLIC PNL TOTAL CA: CPT

## 2020-01-23 PROCEDURE — 96375 TX/PRO/DX INJ NEW DRUG ADDON: CPT

## 2020-01-23 PROCEDURE — 85610 PROTHROMBIN TIME: CPT

## 2020-01-23 PROCEDURE — 80061 LIPID PANEL: CPT

## 2020-01-23 PROCEDURE — 76700 US EXAM ABDOM COMPLETE: CPT

## 2020-01-23 PROCEDURE — 82947 ASSAY GLUCOSE BLOOD QUANT: CPT

## 2020-01-23 PROCEDURE — 71046 X-RAY EXAM CHEST 2 VIEWS: CPT

## 2020-01-23 PROCEDURE — 82962 GLUCOSE BLOOD TEST: CPT

## 2020-01-23 PROCEDURE — 99238 HOSP IP/OBS DSCHRG MGMT 30/<: CPT

## 2020-01-23 PROCEDURE — 85014 HEMATOCRIT: CPT

## 2020-01-23 PROCEDURE — 84295 ASSAY OF SERUM SODIUM: CPT

## 2020-01-23 PROCEDURE — 99285 EMERGENCY DEPT VISIT HI MDM: CPT | Mod: 25

## 2020-01-23 PROCEDURE — 71045 X-RAY EXAM CHEST 1 VIEW: CPT

## 2020-01-23 PROCEDURE — 82803 BLOOD GASES ANY COMBINATION: CPT

## 2020-01-23 PROCEDURE — 93005 ELECTROCARDIOGRAM TRACING: CPT

## 2020-01-23 PROCEDURE — 83036 HEMOGLOBIN GLYCOSYLATED A1C: CPT

## 2020-01-23 PROCEDURE — 82435 ASSAY OF BLOOD CHLORIDE: CPT

## 2020-01-23 PROCEDURE — 85027 COMPLETE CBC AUTOMATED: CPT

## 2020-01-23 PROCEDURE — 74177 CT ABD & PELVIS W/CONTRAST: CPT

## 2020-01-23 NOTE — DISCHARGE NOTE NURSING/CASE MANAGEMENT/SOCIAL WORK - PATIENT PORTAL LINK FT
You can access the FollowMyHealth Patient Portal offered by [facility] by registering at the following website: [email or web address]. By joining [employer]’s FollowMyHealth portal, you will also be able to view your health information using other applications (apps) compatible with our system.

## 2020-01-23 NOTE — PROGRESS NOTE ADULT - ASSESSMENT
58yo M with PMH/PSH of HTN, HLD pre-DM, s/p umbilical hernia repair and ?lap prostate sx at OSH now a/w SBO. Now resolving     - OOB  - regular diet  - +/+  - D/C home

## 2020-01-23 NOTE — DISCHARGE NOTE NURSING/CASE MANAGEMENT/SOCIAL WORK - NSDCPNDISPN_GEN_ALL_CORE
Safe use, storage and disposal of opioids when prescribed/Activities of daily living, including home environment that might     exacerbate pain or reduce effectiveness of the pain management plan of care as well as strategies to address these issues/Education provided on the pain management plan of care/Side effects of pain management treatment Opioids not applicable/not prescribed

## 2020-01-23 NOTE — DISCHARGE NOTE NURSING/CASE MANAGEMENT/SOCIAL WORK - NSDCFUADDAPPT_GEN_ALL_CORE_FT
Please follow up with your Hosptial Doctor (Dr Gamez) only if needed at [address] [place] [address] [address], [place] [address] , phone #[phone].

## 2020-01-23 NOTE — PROGRESS NOTE ADULT - SUBJECTIVE AND OBJECTIVE BOX
SURGERY DAILY PROGRESS NOTE:       SUBJECTIVE/ROS: Patient examined at bedside. Claims to feel well, passing gas and having bowel movements, ready for discharge   Denies nausea, vomiting, chest pain, shortness of breath         MEDICATIONS  (STANDING):  dextrose 5%. 1000 milliLiter(s) (50 mL/Hr) IV Continuous <Continuous>  dextrose 50% Injectable 12.5 Gram(s) IV Push once  dextrose 50% Injectable 25 Gram(s) IV Push once  dextrose 50% Injectable 25 Gram(s) IV Push once  enoxaparin Injectable 40 milliGRAM(s) SubCutaneous daily  insulin lispro (HumaLOG) corrective regimen sliding scale   SubCutaneous three times a day before meals  insulin lispro (HumaLOG) corrective regimen sliding scale   SubCutaneous at bedtime    MEDICATIONS  (PRN):  dextrose 40% Gel 15 Gram(s) Oral once PRN Blood Glucose LESS THAN 70 milliGRAM(s)/deciliter  glucagon  Injectable 1 milliGRAM(s) IntraMuscular once PRN Glucose LESS THAN 70 milligrams/deciliter      OBJECTIVE:    Vital Signs Last 24 Hrs  T(C): 36.6 (23 Jan 2020 05:16), Max: 37.1 (22 Jan 2020 17:08)  T(F): 97.9 (23 Jan 2020 05:16), Max: 98.8 (22 Jan 2020 17:08)  HR: 67 (23 Jan 2020 05:16) (67 - 84)  BP: 131/71 (23 Jan 2020 05:16) (120/61 - 162/77)  BP(mean): --  RR: 18 (23 Jan 2020 05:16) (18 - 18)  SpO2: 98% (23 Jan 2020 05:16) (94% - 98%)        I&O's Detail    22 Jan 2020 07:01  -  23 Jan 2020 07:00  --------------------------------------------------------  IN:    Oral Fluid: 360 mL  Total IN: 360 mL    OUT:    Voided: 1450 mL  Total OUT: 1450 mL    Total NET: -1090 mL          Daily     Daily     LABS:                        13.8   5.69  )-----------( 173      ( 22 Jan 2020 08:09 )             40.9     01-22    141  |  102  |  12  ----------------------------<  98  4.0   |  25  |  0.86    Ca    8.7      22 Jan 2020 07:09  Phos  2.7     01-22  Mg     1.8     01-22    TPro  6.1  /  Alb  3.6  /  TBili  1.5<H>  /  DBili  x   /  AST  24  /  ALT  34  /  AlkPhos  49  01-21                      PHYSICAL EXAM:  Constitutional: well developed, well nourished, NAD  Eyes: anicteric  ENMT: normal facies, symmetric  Respiratory: Breathing comfortably    Gastrointestinal: abdomen soft, nontender, nondistended.   Extremities: FROM, warm  Neurological: intact, non-focal  Psychiatric: oriented x 3; appropriate

## 2020-03-01 ENCOUNTER — TRANSCRIPTION ENCOUNTER (OUTPATIENT)
Age: 60
End: 2020-03-01

## 2020-03-31 ENCOUNTER — APPOINTMENT (OUTPATIENT)
Dept: ENDOCRINOLOGY | Facility: CLINIC | Age: 60
End: 2020-03-31

## 2020-07-13 NOTE — PATIENT PROFILE ADULT - NSPROEDAABILITYLEARN_GEN_A_NUR
Last seen in office 6/3/2020 no follow up appointment scheduled at this time   Last refill given on insulin pen needles 8/8/2019  Last labs done 6/2020  Refill sent in for 100 needles with 12 additional refills     none

## 2020-12-21 ENCOUNTER — NON-APPOINTMENT (OUTPATIENT)
Age: 60
End: 2020-12-21

## 2020-12-21 ENCOUNTER — APPOINTMENT (OUTPATIENT)
Dept: CARDIOLOGY | Facility: CLINIC | Age: 60
End: 2020-12-21
Payer: COMMERCIAL

## 2020-12-21 VITALS
DIASTOLIC BLOOD PRESSURE: 78 MMHG | TEMPERATURE: 97.9 F | SYSTOLIC BLOOD PRESSURE: 139 MMHG | OXYGEN SATURATION: 99 % | HEART RATE: 73 BPM | HEIGHT: 70 IN | BODY MASS INDEX: 30.64 KG/M2 | WEIGHT: 214 LBS | RESPIRATION RATE: 17 BRPM

## 2020-12-21 VITALS — DIASTOLIC BLOOD PRESSURE: 70 MMHG | SYSTOLIC BLOOD PRESSURE: 120 MMHG

## 2020-12-21 PROCEDURE — 99214 OFFICE O/P EST MOD 30 MIN: CPT

## 2020-12-21 PROCEDURE — 93000 ELECTROCARDIOGRAM COMPLETE: CPT

## 2020-12-21 PROCEDURE — 99072 ADDL SUPL MATRL&STAF TM PHE: CPT

## 2020-12-21 RX ORDER — METFORMIN ER 500 MG 500 MG/1
500 TABLET ORAL
Qty: 180 | Refills: 0 | Status: DISCONTINUED | COMMUNITY
Start: 2020-09-06

## 2020-12-21 RX ORDER — BUSPIRONE HYDROCHLORIDE 10 MG/1
10 TABLET ORAL
Refills: 0 | Status: DISCONTINUED | COMMUNITY
Start: 2019-12-30 | End: 2020-12-21

## 2020-12-21 NOTE — HISTORY OF PRESENT ILLNESS
[FreeTextEntry1] : Presents after a 1 year hiatus, reporting that he has been feeling well. He is generally active, but has not been exercising with the exception of calisthenics.  He has, however, lost weight.  No effort provoked symptoms with activities of daily living, house and yard work and stair climbing.\par \par No c/o chest, throat,jaw, arm or upper back discomfort.  No dyspnea, orthopnea or PND.  No palpitations, dizziness or syncope.  No edema or claudication.\par \par Diet reviewed: Red meat once or twice a week.  Fish perhaps once weekly.  Salts.  Tends to limit simple carbohydrates.\par \par

## 2020-12-22 LAB
BASOPHILS # BLD AUTO: 0.04 K/UL
BASOPHILS NFR BLD AUTO: 0.8 %
EOSINOPHIL # BLD AUTO: 0.35 K/UL
EOSINOPHIL NFR BLD AUTO: 6.7 %
HCT VFR BLD CALC: 45.8 %
HGB BLD-MCNC: 14.5 G/DL
IMM GRANULOCYTES NFR BLD AUTO: 0.2 %
LYMPHOCYTES # BLD AUTO: 1.38 K/UL
LYMPHOCYTES NFR BLD AUTO: 26.2 %
MAN DIFF?: NORMAL
MCHC RBC-ENTMCNC: 29.8 PG
MCHC RBC-ENTMCNC: 31.7 GM/DL
MCV RBC AUTO: 94 FL
MONOCYTES # BLD AUTO: 0.51 K/UL
MONOCYTES NFR BLD AUTO: 9.7 %
NEUTROPHILS # BLD AUTO: 2.97 K/UL
NEUTROPHILS NFR BLD AUTO: 56.4 %
PLATELET # BLD AUTO: 235 K/UL
RBC # BLD: 4.87 M/UL
RBC # FLD: 13.2 %
WBC # FLD AUTO: 5.26 K/UL

## 2020-12-23 LAB
25(OH)D3 SERPL-MCNC: 30.7 NG/ML
ALBUMIN SERPL ELPH-MCNC: 4.4 G/DL
ALP BLD-CCNC: 67 U/L
ALT SERPL-CCNC: 20 U/L
ANION GAP SERPL CALC-SCNC: 14 MMOL/L
AST SERPL-CCNC: 18 U/L
BILIRUB SERPL-MCNC: 0.7 MG/DL
BUN SERPL-MCNC: 15 MG/DL
CALCIUM SERPL-MCNC: 9.7 MG/DL
CHLORIDE SERPL-SCNC: 106 MMOL/L
CHOLEST SERPL-MCNC: 137 MG/DL
CO2 SERPL-SCNC: 23 MMOL/L
CREAT SERPL-MCNC: 1.07 MG/DL
ESTIMATED AVERAGE GLUCOSE: 123 MG/DL
GLUCOSE SERPL-MCNC: 95 MG/DL
HBA1C MFR BLD HPLC: 5.9 %
HDLC SERPL-MCNC: 47 MG/DL
LDLC SERPL CALC-MCNC: 72 MG/DL
LDLC SERPL DIRECT ASSAY-MCNC: 68 MG/DL
NONHDLC SERPL-MCNC: 90 MG/DL
POTASSIUM SERPL-SCNC: 4.6 MMOL/L
PROT SERPL-MCNC: 7 G/DL
SODIUM SERPL-SCNC: 143 MMOL/L
TRIGL SERPL-MCNC: 90 MG/DL
TSH SERPL-ACNC: 1.49 UIU/ML
URATE SERPL-MCNC: 5.3 MG/DL

## 2021-02-25 PROBLEM — F32.9 MAJOR DEPRESSIVE DISORDER, SINGLE EPISODE, UNSPECIFIED: Chronic | Status: ACTIVE | Noted: 2020-01-20

## 2021-02-25 PROBLEM — E78.5 HYPERLIPIDEMIA, UNSPECIFIED: Chronic | Status: ACTIVE | Noted: 2020-01-20

## 2021-02-25 PROBLEM — R73.03 PREDIABETES: Chronic | Status: ACTIVE | Noted: 2020-01-21

## 2021-02-25 PROBLEM — I10 ESSENTIAL (PRIMARY) HYPERTENSION: Chronic | Status: ACTIVE | Noted: 2020-01-20

## 2021-04-01 ENCOUNTER — APPOINTMENT (OUTPATIENT)
Dept: GASTROENTEROLOGY | Facility: CLINIC | Age: 61
End: 2021-04-01
Payer: COMMERCIAL

## 2021-04-01 VITALS
HEIGHT: 70 IN | BODY MASS INDEX: 30.35 KG/M2 | HEART RATE: 76 BPM | DIASTOLIC BLOOD PRESSURE: 72 MMHG | SYSTOLIC BLOOD PRESSURE: 129 MMHG | WEIGHT: 212 LBS

## 2021-04-01 PROCEDURE — 82274 ASSAY TEST FOR BLOOD FECAL: CPT | Mod: QW

## 2021-04-01 PROCEDURE — 99214 OFFICE O/P EST MOD 30 MIN: CPT

## 2021-04-01 PROCEDURE — 99072 ADDL SUPL MATRL&STAF TM PHE: CPT

## 2021-04-01 RX ORDER — NAPROXEN 500 MG/1
500 TABLET ORAL
Qty: 60 | Refills: 0 | Status: DISCONTINUED | COMMUNITY
Start: 2016-10-31 | End: 2021-04-01

## 2021-04-01 RX ORDER — BACILLUS COAGULANS/INULIN 1B-250 MG
CAPSULE ORAL
Refills: 0 | Status: ACTIVE | COMMUNITY

## 2021-04-01 NOTE — ASSESSMENT
[FreeTextEntry1] : 1.  Status post small bowel obstructions 2020, statistically likely from adhesions.\par 2.  History of colonic polyps, with tubular adenomas and hyperplastic polyp at last colonoscopy June 2019.\par 3.  History of anal fistula, status post surgery. Intermittent pruritus ani and soiling.\par 4.  Cholelithiasis.  \par 5.  Obesity.\par 6.  Borderline type 2 diabetes mellitus.\par 7.  Obstructive sleep apnea.\par 8.  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 Manriquez.\par 9.  Hyperlipidemia.\par 10. History of depression.\par 11. Status post umbilical herniorrhaphy, radical prostatectomy 2016.  \par \par Plan:\par 1.  Medical records reviewed.\par 2.  Low fiber/low residue diet advised--instruction and handout given.  Low-fat diet, watchful waiting regarding gallstones.\par 3.  Return here after 1 year; will discuss whether to proceed with surveillance colonoscopy at that time, perhaps wait an extra couple of years, pending clinical course.\par

## 2021-04-01 NOTE — REASON FOR VISIT
[Follow-Up: _____] : a [unfilled] follow-up visit [FreeTextEntry1] : Follow up from hospital visit in January

## 2021-04-01 NOTE — CONSULT LETTER
[Dear  ___] : Dear  [unfilled], [Courtesy Letter:] : I had the pleasure of seeing your patient, [unfilled], in my office today. [Please see my note below.] : Please see my note below. [Consult Closing:] : Thank you very much for allowing me to participate in the care of this patient.  If you have any questions, please do not hesitate to contact me. [Sincerely,] : Sincerely, [FreeTextEntry3] : Vance James M.D.\par  [DrDay  ___] : Dr. LEAVITT

## 2021-04-01 NOTE — PHYSICAL EXAM
[General Appearance - Alert] : alert [General Appearance - In No Acute Distress] : in no acute distress [General Appearance - Well Nourished] : well nourished [General Appearance - Well Developed] : well developed [Sclera] : the sclera and conjunctiva were normal [Neck Appearance] : the appearance of the neck was normal [Jugular Venous Distention Increased] : there was no jugular-venous distention [Neck Cervical Mass (___cm)] : no neck mass was observed [Thyroid Diffuse Enlargement] : the thyroid was not enlarged [Thyroid Nodule] : there were no palpable thyroid nodules [Auscultation Breath Sounds / Voice Sounds] : lungs were clear to auscultation bilaterally [Heart Rate And Rhythm] : heart rate was normal and rhythm regular [Heart Sounds] : normal S1 and S2 [Heart Sounds Gallop] : no gallops [Murmurs] : no murmurs [Heart Sounds Pericardial Friction Rub] : no pericardial rub [Full Pulse] : the pedal pulses are present [Edema] : there was no peripheral edema [Bowel Sounds] : normal bowel sounds [Abdomen Soft] : soft [Abdomen Tenderness] : non-tender [Abdomen Mass (___ Cm)] : no abdominal mass palpated [Abdomen Hernia] : no hernia was discovered [Normal Sphincter Tone] : normal sphincter tone [No Rectal Mass] : no rectal mass [Prostate Absent] : was absent [Cervical Lymph Nodes Enlarged Posterior Bilaterally] : posterior cervical [Cervical Lymph Nodes Enlarged Anterior Bilaterally] : anterior cervical [Supraclavicular Lymph Nodes Enlarged Bilaterally] : supraclavicular [Inguinal Lymph Nodes Enlarged Bilaterally] : inguinal [Abnormal Walk] : normal gait [Nail Clubbing] : no clubbing  or cyanosis of the fingernails [Musculoskeletal - Swelling] : no joint swelling seen [Skin Color & Pigmentation] : normal skin color and pigmentation [Skin Turgor] : normal skin turgor [] : no rash [Oriented To Time, Place, And Person] : oriented to person, place, and time [Impaired Insight] : insight and judgment were intact [Affect] : the affect was normal [Internal Hemorrhoid] : no internal hemorrhoids [External Hemorrhoid] : no external hemorrhoids [Occult Blood Positive] : stool was negative for occult blood [FreeTextEntry1] : FIT negative

## 2021-05-10 ENCOUNTER — NON-APPOINTMENT (OUTPATIENT)
Age: 61
End: 2021-05-10

## 2021-08-04 ENCOUNTER — APPOINTMENT (OUTPATIENT)
Dept: CARDIOLOGY | Facility: CLINIC | Age: 61
End: 2021-08-04
Payer: COMMERCIAL

## 2021-08-04 ENCOUNTER — NON-APPOINTMENT (OUTPATIENT)
Age: 61
End: 2021-08-04

## 2021-08-04 VITALS
OXYGEN SATURATION: 97 % | WEIGHT: 208 LBS | SYSTOLIC BLOOD PRESSURE: 124 MMHG | BODY MASS INDEX: 29.85 KG/M2 | HEART RATE: 71 BPM | DIASTOLIC BLOOD PRESSURE: 70 MMHG

## 2021-08-04 PROCEDURE — 99214 OFFICE O/P EST MOD 30 MIN: CPT

## 2021-08-04 PROCEDURE — 93000 ELECTROCARDIOGRAM COMPLETE: CPT

## 2021-08-04 NOTE — HISTORY OF PRESENT ILLNESS
[FreeTextEntry1] : Presents follow-up related to his wife's is about recent blood pressure spikes.  Very stressful period; his mother  of complication related to Alzheimer's disease during their trip to [place] a few weeks ago.  No family member history at the female's wife suffered a syncopal episode and was hospitalized.  Blood pressure upon return home was 170/100 (previously well controlled)\par No c/o chest, throat,jaw, arm or upper back discomfort.  No dyspnea, orthopnea or PND.  No palpitations, dizziness or syncope.  No edema or claudication.\par \par He has been dieting and losing weight.  Although sleep is still impaired, he feels that he has been snoring less.  Never pursued formal sleep study after indeterminate home study.\par \par He is active around the house and yard, he has been walking occasionally and swimming laps a few days a week and using light weights without any effort provoked symptoms.\par \par

## 2021-08-05 LAB
ALBUMIN SERPL ELPH-MCNC: 4.6 G/DL
ALP BLD-CCNC: 64 U/L
ALT SERPL-CCNC: 23 U/L
ANION GAP SERPL CALC-SCNC: 12 MMOL/L
AST SERPL-CCNC: 15 U/L
BASOPHILS # BLD AUTO: 0.05 K/UL
BASOPHILS NFR BLD AUTO: 0.7 %
BILIRUB SERPL-MCNC: 0.6 MG/DL
BUN SERPL-MCNC: 13 MG/DL
CALCIUM SERPL-MCNC: 9.7 MG/DL
CHLORIDE SERPL-SCNC: 104 MMOL/L
CHOLEST SERPL-MCNC: 140 MG/DL
CO2 SERPL-SCNC: 25 MMOL/L
CREAT SERPL-MCNC: 0.84 MG/DL
EOSINOPHIL # BLD AUTO: 0.44 K/UL
EOSINOPHIL NFR BLD AUTO: 6.2 %
ESTIMATED AVERAGE GLUCOSE: 123 MG/DL
GLUCOSE SERPL-MCNC: 102 MG/DL
HBA1C MFR BLD HPLC: 5.9 %
HCT VFR BLD CALC: 43.3 %
HDLC SERPL-MCNC: 47 MG/DL
HGB BLD-MCNC: 14.2 G/DL
IMM GRANULOCYTES NFR BLD AUTO: 0.1 %
LDLC SERPL CALC-MCNC: 73 MG/DL
LDLC SERPL DIRECT ASSAY-MCNC: 72 MG/DL
LYMPHOCYTES # BLD AUTO: 2.26 K/UL
LYMPHOCYTES NFR BLD AUTO: 31.8 %
MAN DIFF?: NORMAL
MCHC RBC-ENTMCNC: 30.3 PG
MCHC RBC-ENTMCNC: 32.8 GM/DL
MCV RBC AUTO: 92.3 FL
MONOCYTES # BLD AUTO: 0.55 K/UL
MONOCYTES NFR BLD AUTO: 7.7 %
NEUTROPHILS # BLD AUTO: 3.79 K/UL
NEUTROPHILS NFR BLD AUTO: 53.5 %
NONHDLC SERPL-MCNC: 93 MG/DL
PLATELET # BLD AUTO: 256 K/UL
POTASSIUM SERPL-SCNC: 4.6 MMOL/L
PROT SERPL-MCNC: 6.8 G/DL
RBC # BLD: 4.69 M/UL
RBC # FLD: 13 %
SODIUM SERPL-SCNC: 141 MMOL/L
TRIGL SERPL-MCNC: 97 MG/DL
WBC # FLD AUTO: 7.1 K/UL

## 2021-08-23 NOTE — ED PROVIDER NOTE - NSFOLLOWUPCLINICSTOKEN_GEN_ALL_ED_FT
Photo Preface (Leave Blank If You Do Not Want): Photographs obtained today. Detail Level: Zone [id number]:;

## 2022-08-04 ENCOUNTER — APPOINTMENT (OUTPATIENT)
Dept: CARDIOLOGY | Facility: CLINIC | Age: 62
End: 2022-08-04

## 2022-08-23 ENCOUNTER — APPOINTMENT (OUTPATIENT)
Dept: PEDIATRIC ALLERGY IMMUNOLOGY | Facility: CLINIC | Age: 62
End: 2022-08-23

## 2022-08-23 VITALS
TEMPERATURE: 98 F | BODY MASS INDEX: 30.12 KG/M2 | SYSTOLIC BLOOD PRESSURE: 155 MMHG | WEIGHT: 208 LBS | DIASTOLIC BLOOD PRESSURE: 77 MMHG | HEIGHT: 69.5 IN | HEART RATE: 78 BPM | OXYGEN SATURATION: 98 %

## 2022-08-23 DIAGNOSIS — R43.8 OTHER DISTURBANCES OF SMELL AND TASTE: ICD-10-CM

## 2022-08-23 DIAGNOSIS — U09.9 OTHER DISTURBANCES OF SMELL AND TASTE: ICD-10-CM

## 2022-08-23 PROCEDURE — 99214 OFFICE O/P EST MOD 30 MIN: CPT

## 2022-08-23 NOTE — REASON FOR VISIT
[Routine Follow-Up] : a routine follow-up visit for [Allergy Evaluation/ Skin Testing] : allergy evaluation and or skin testing [Asthma] : asthma [Cough] : cough

## 2022-08-23 NOTE — ASSESSMENT
[FreeTextEntry1] : 62 yr old with chronic rhinitis, allergic rhinitis, no visible polyps and loss of taste and smell for several years recently responsive to a short course of oral steroids \par \par Need to arrange for ST when off H1 blockers\par \par Suggest ENT referral for nasal endoscopy to rule out polyps\par \par Start\par Claritin 10 mg qd-bid\par Flonase 2s qd\par Singulair 10 mg qd\par Consider azelastine if no better\par \par Follow up 4-6 weeks. \par \par Total MD time spent on this encounter was 35 minutes.  This includes time devoted to preparing to see the patient with review of previous medical record, obtaining medical history, performing physical exam, counseling and patient education with patient and family, ordering medications and lab studies, documentation in the medical record and coordination of care.\par \par \par

## 2022-08-23 NOTE — REVIEW OF SYSTEMS
[Rhinorrhea] : rhinorrhea [Nasal Congestion] : nasal congestion [Nasal Itching] : nasal itching [Post Nasal Drip] : post nasal drip [Sneezing] : sneezing [Difficulty Breathing] : no dyspnea [Nocturnal Awakening] : no nocturnal awakening with shortness of breath [Cough] : cough [Wheezing Worsens With Exercise] : wheezing does not worsen with exercise [Wheezing] : no wheezing [Nl] : Integumentary

## 2022-08-23 NOTE — PHYSICAL EXAM
[Alert] : alert [Well Nourished] : well nourished [No Discharge] : no discharge [Normal TMs] : both tympanic membranes were normal [No Thrush] : no thrush [Pale mucosa] : pale mucosa [Boggy Nasal Turbinates] : boggy and/or pale nasal turbinates [Pharyngeal erythema] : no pharyngeal erythema [Posterior Pharyngeal Cobblestoning] : no posterior pharyngeal cobblestoning [Clear Rhinorrhea] : clear rhinorrhea was seen [No Neck Mass] : no neck mass was observed [Normal Rate and Effort] : normal respiratory rhythm and effort [Wheezing] : no wheezing was heard [Normal Rate] : heart rate was normal  [Skin Intact] : skin intact  [Patches] : no patches

## 2022-08-23 NOTE — HISTORY OF PRESENT ILLNESS
[de-identified] : 62 yr old not seen since 9/19 - usually followed for AR and non specific rhinitis.  Past few yrs pt has noted increase loss of smell and taste accompanied by nasal congestion and post nasal drip - ?? last ST - pt usually takes Claritin 10 mg qd and Flonase and Singulair PRN which usually helps but nasal congestion has increased.\par In early July pt had COVID and developed a chronic cough treated by Dr. Carlson with Albuterol MDI which helped and eventually a short course of oral steroids where cough disappeared. While on oral steroids, sense of taste and smell improved. \par Pt took Claritin today and cannot be ST

## 2022-08-30 ENCOUNTER — APPOINTMENT (OUTPATIENT)
Dept: PULMONOLOGY | Facility: CLINIC | Age: 62
End: 2022-08-30

## 2022-08-30 VITALS
DIASTOLIC BLOOD PRESSURE: 73 MMHG | TEMPERATURE: 98.2 F | OXYGEN SATURATION: 96 % | HEART RATE: 80 BPM | SYSTOLIC BLOOD PRESSURE: 131 MMHG

## 2022-08-30 DIAGNOSIS — G47.33 OBSTRUCTIVE SLEEP APNEA (ADULT) (PEDIATRIC): ICD-10-CM

## 2022-08-30 DIAGNOSIS — U07.1 COVID-19: ICD-10-CM

## 2022-08-30 DIAGNOSIS — G47.52 REM SLEEP BEHAVIOR DISORDER: ICD-10-CM

## 2022-08-30 PROCEDURE — 94010 BREATHING CAPACITY TEST: CPT

## 2022-08-30 PROCEDURE — 71046 X-RAY EXAM CHEST 2 VIEWS: CPT

## 2022-08-30 PROCEDURE — 99203 OFFICE O/P NEW LOW 30 MIN: CPT | Mod: 25

## 2022-09-01 PROBLEM — G47.52 UNCONTROLLED REM SLEEP BEHAVIOR DISORDER: Status: ACTIVE | Noted: 2022-09-01

## 2022-09-01 PROBLEM — U07.1 COVID-19: Status: ACTIVE | Noted: 2022-09-01

## 2022-09-01 NOTE — ASSESSMENT
[FreeTextEntry1] : Recovered from COVID from pulmonary perspective no evidence of active airways disease or radiographic abnormality he has some symptoms that appear to be related to allergies.\par \par His history is highly suggestive of REM behavior disorder it may have been triggered by bupropion although it is the least likely of these medications to trigger REM behavior disorder.  Generally REM behavior disorder is triggered by SSRI's and similar medications.  Even if the medication is the trigger it still does not rule out the possibility that there is an underlying disorder present.  In addition he does have historical features of JYOTI and it is often difficult to differentiate the symptoms of JYOTI from those of REM behavior disorder he should have an updated home sleep study and we did discussed bedroom safety at length
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
## 2022-09-01 NOTE — PHYSICAL EXAM
[No Acute Distress] : no acute distress [Normal Oropharynx] : normal oropharynx [Normal Appearance] : normal appearance [No Neck Mass] : no neck mass [Normal Rate/Rhythm] : normal rate/rhythm [Normal S1, S2] : normal s1, s2 [No Murmurs] : no murmurs [No Resp Distress] : no resp distress [Clear to Auscultation Bilaterally] : clear to auscultation bilaterally [No Abnormalities] : no abnormalities [Benign] : benign [Normal Gait] : normal gait [No Clubbing] : no clubbing [No Cyanosis] : no cyanosis [No Edema] : no edema [FROM] : FROM [Normal Color/ Pigmentation] : normal color/ pigmentation [Oriented x3] : oriented x3 [Normal Affect] : normal affect [TextBox_132] : REM behavir disorder

## 2022-11-28 ENCOUNTER — APPOINTMENT (OUTPATIENT)
Dept: PEDIATRIC ALLERGY IMMUNOLOGY | Facility: CLINIC | Age: 62
End: 2022-11-28

## 2022-11-28 DIAGNOSIS — J31.0 CHRONIC RHINITIS: ICD-10-CM

## 2022-11-28 DIAGNOSIS — J30.9 ALLERGIC RHINITIS, UNSPECIFIED: ICD-10-CM

## 2022-11-28 PROCEDURE — 99213 OFFICE O/P EST LOW 20 MIN: CPT

## 2022-11-28 RX ORDER — NIRMATRELVIR AND RITONAVIR 300-100 MG
20 X 150 MG & KIT ORAL
Qty: 30 | Refills: 0 | Status: COMPLETED | COMMUNITY
Start: 2022-07-12

## 2022-11-28 RX ORDER — BUPROPION HYDROCHLORIDE 150 MG/1
150 TABLET, FILM COATED, EXTENDED RELEASE ORAL
Qty: 90 | Refills: 0 | Status: COMPLETED | COMMUNITY
Start: 2020-09-17 | End: 2022-11-28

## 2022-11-28 RX ORDER — AMOXICILLIN AND CLAVULANATE POTASSIUM 875; 125 MG/1; MG/1
875-125 TABLET, COATED ORAL
Qty: 20 | Refills: 0 | Status: COMPLETED | COMMUNITY
Start: 2022-08-12

## 2022-11-28 RX ORDER — PREDNISONE 20 MG/1
20 TABLET ORAL
Qty: 10 | Refills: 0 | Status: COMPLETED | COMMUNITY
Start: 2022-08-12

## 2022-11-28 RX ORDER — BUPROPION HYDROCHLORIDE 150 MG/1
150 TABLET, EXTENDED RELEASE ORAL
Qty: 90 | Refills: 0 | Status: COMPLETED | COMMUNITY
Start: 2022-04-25

## 2022-11-28 NOTE — REASON FOR VISIT
[Routine Follow-Up] : a routine follow-up visit for [Allergy Evaluation/ Skin Testing] : allergy evaluation and or skin testing [Congestion] : congestion [Runny Nose] : runny nose

## 2022-11-28 NOTE — ASSESSMENT
[FreeTextEntry1] : 62 yr old followed for AR and non specific rhinitis mostly controlled by daily Claritin 10mg  and PRN use of Singulair and Flonase presents with some rhinorrhea and epiphora after acute URI\par \par Doing ST or aeroallergen ImmunoCAP to provide additional information about his symptoms.  Can hold off if patient prefers\par \par Suggest:\par - Continue Claritin 10mg 1 tab PO QD\par -Discuss PRN vs qd use of Flonaes and Singulair with patient\par - If symptoms become more persistent would be better to use Flonase 2s and Singulair 10mg daily rather than PRN\par - Pt wants to hold on any more ST for now - does not see the need\par Follow-up in 6-12 months\par \par Patient was seen with CARLOZ Rios\par \par Total MD time spent on this encounter was 25 minutes.  This includes time devoted to preparing to see the patient with review of previous medical record, obtaining medical history, performing physical exam, counseling and patient education with patient and family, ordering medications and lab studies, documentation in the medical record and coordination of care.\par \par

## 2022-11-28 NOTE — REVIEW OF SYSTEMS
[Eye Discharge] : eye discharge [Rhinorrhea] : rhinorrhea [Snoring] : snoring [Nl] : Integumentary [Immunizations are up to date] : Immunizations are up to date

## 2022-11-28 NOTE — HISTORY OF PRESENT ILLNESS
[de-identified] : 62 yr old last seen 8/23/22 - usually followed for AR and non specific rhinitis. Past few yrs pt has noted increase loss of smell and taste accompanied by nasal congestion and post nasal drip - ?? last ST - pt usually takes Claritin 10 mg qd and Flonase and Singulair PRN which usually helps but nasal congestion has increased.\par In early July pt had COVID and developed a chronic cough treated by Dr. Carlson with Albuterol MDI which helped and eventually a short course of oral steroids where cough disappeared. While on oral steroids, sense of taste and smell improved. \par Pt took Claritin today and cannot be ST\par \par Suggest ENT referral for nasal endoscopy to rule out polyps. Start Claritin 10 mg qd-bid, Flonase 2s qd, Singulair 10 mg qd and azelastine if no better\par \par Patient seen by pulmonologist Dr. Reynolds 8/30/22 and CXR  and PFT both normal. Home sleep study recommended but never done. Patient claims still snoring but no signs of apnea currently.\par \par Patient now back and claims since last visit overall doing well. He typically controls symptoms with Claritin 10mg daily and uses both Flonase and Singulair 10mg PRN. Currently he's getting over a cold which caused a scratchy throat and fatigue. He claims he's feeling better but has some runny nose and tearing of eyes. He was supposed to f/u for ST but admits to taking Claritin 10mg today. He is not sure he wants to be skin tested. \par

## 2022-11-28 NOTE — PHYSICAL EXAM
[Alert] : alert [Well Nourished] : well nourished [Healthy Appearance] : healthy appearance [No Acute Distress] : no acute distress [Well Developed] : well developed [Normal Voice/Communication] : normal voice communication [Normal Pupil & Iris Size/Symmetry] : normal pupil and iris size and symmetry [Normal TMs] : both tympanic membranes were normal [Normal Lips/Tongue] : the lips and tongue were normal [Normal Outer Ear/Nose] : the ears and nose were normal in appearance [Normal Tonsils] : normal tonsils [No Thrush] : no thrush [Normal Rate and Effort] : normal respiratory rhythm and effort [Bilateral Audible Breath Sounds] : bilateral audible breath sounds [Normal Rate] : heart rate was normal  [Normal Cervical Lymph Nodes] : cervical [Skin Intact] : skin intact  [No Rash] : no rash [Normal Mood] : mood was normal [Normal Affect] : affect was normal [Judgment and Insight Age Appropriate] : judgement and insight is age appropriate [Alert, Awake, Oriented as Age-Appropriate] : alert, awake, oriented as age appropriate [de-identified] : dry nasal mucosa

## 2022-12-15 ENCOUNTER — APPOINTMENT (OUTPATIENT)
Dept: GASTROENTEROLOGY | Facility: CLINIC | Age: 62
End: 2022-12-15

## 2022-12-15 VITALS
DIASTOLIC BLOOD PRESSURE: 75 MMHG | SYSTOLIC BLOOD PRESSURE: 152 MMHG | HEART RATE: 82 BPM | HEIGHT: 69.5 IN | BODY MASS INDEX: 30.26 KG/M2 | WEIGHT: 209 LBS

## 2022-12-15 DIAGNOSIS — R13.10 DYSPHAGIA, UNSPECIFIED: ICD-10-CM

## 2022-12-15 DIAGNOSIS — Z86.010 ENCOUNTER FOR SCREENING FOR MALIGNANT NEOPLASM OF COLON: ICD-10-CM

## 2022-12-15 DIAGNOSIS — Z12.11 ENCOUNTER FOR SCREENING FOR MALIGNANT NEOPLASM OF COLON: ICD-10-CM

## 2022-12-15 PROCEDURE — 99214 OFFICE O/P EST MOD 30 MIN: CPT

## 2022-12-15 RX ORDER — EMPAGLIFLOZIN 10 MG/1
10 TABLET, FILM COATED ORAL
Qty: 90 | Refills: 0 | Status: DISCONTINUED | COMMUNITY
Start: 2022-11-03 | End: 2022-12-15

## 2022-12-15 RX ORDER — BUPROPION HYDROCHLORIDE 150 MG/1
150 TABLET, EXTENDED RELEASE ORAL
Qty: 30 | Refills: 0 | Status: DISCONTINUED | COMMUNITY
Start: 2022-11-14 | End: 2022-12-15

## 2022-12-15 NOTE — REASON FOR VISIT
[Follow-up] : a follow-up of an existing diagnosis [FreeTextEntry1] : surveillance colonoscopy, eructation

## 2022-12-15 NOTE — REVIEW OF SYSTEMS
[Recent Weight Gain (___ Lbs)] : recent [unfilled] ~Ulb weight gain [Abdominal Pain] : abdominal pain [Constipation] : constipation [Diarrhea] : diarrhea [Anxiety] : anxiety [Depression] : depression [Negative] : Heme/Lymph [FreeTextEntry4] : Nasal discharge [FreeTextEntry7] : Belching

## 2022-12-15 NOTE — ASSESSMENT
[FreeTextEntry1] : 1.  Encounter for surveillance colonoscopy.  History of colonic polyps, with tubular adenomas and hyperplastic polyp at last colonoscopy June 2019.\par 2.  Eructation, subjective dysphagia with pills, dry foods.  Likely due to GERD, esophagitis.  Rule out structural lesions/ hernia, Daniels's.\par 3.  Status post small bowel obstructions 2020, statistically likely from adhesions.\par 4.  History of anal fistula, status post surgery. Intermittent pruritus ani and soiling.\par 5.  Cholelithiasis.  \par 6.  Obesity.\par 7.  Type 2 diabetes mellitus.\par 8.  Obstructive sleep apnea.\par 9.  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 Manriquez.\par 10.  Hyperlipidemia.\par 11. History of depression.\par 12. Status post umbilical herniorrhaphy, radical prostatectomy 2016.  \par \par Plan:\par 1.  Prior records reviewed.  Obtain recent labs for review.\par 2.  Small, frequent meals.  Chew food thoroughly and eat slowly.\par 3.  Patient was advised to undergo endoscopy and colonoscopy- procedure, risks, benefits, and alternatives were explained. Patient agreeable. Brochure given. Miralax prep.

## 2022-12-29 ENCOUNTER — EMERGENCY (EMERGENCY)
Facility: HOSPITAL | Age: 62
LOS: 1 days | Discharge: HOME | End: 2022-12-29
Attending: EMERGENCY MEDICINE
Payer: COMMERCIAL

## 2022-12-29 VITALS
HEART RATE: 87 BPM | OXYGEN SATURATION: 100 % | SYSTOLIC BLOOD PRESSURE: 150 MMHG | WEIGHT: 203.05 LBS | RESPIRATION RATE: 17 BRPM | TEMPERATURE: 98 F | DIASTOLIC BLOOD PRESSURE: 80 MMHG | HEIGHT: 70 IN

## 2022-12-29 DIAGNOSIS — Z90.79 ACQUIRED ABSENCE OF OTHER GENITAL ORGAN(S): Chronic | ICD-10-CM

## 2022-12-29 DIAGNOSIS — K42.9 UMBILICAL HERNIA WITHOUT OBSTRUCTION OR GANGRENE: Chronic | ICD-10-CM

## 2022-12-29 PROCEDURE — 99285 EMERGENCY DEPT VISIT HI MDM: CPT

## 2022-12-29 NOTE — ED PROVIDER NOTE - NSICDXPASTMEDICALHX_GEN_ALL_CORE_FT
PAST MEDICAL HISTORY:  Depression     HLD (hyperlipidemia)     HTN (hypertension)     Pre-diabetes

## 2022-12-29 NOTE — ED PROVIDER NOTE - RAPID ASSESSMENT
Meseret Osullivan MD - Attending Physician: H/o SBO 1/21. Today with bloating, epigastric pain. Now with nausea and worsening pain, chills, dizziness. No fevers, chest pain, or SOB    *** I, Meseret Osullivan MD, performed an initial face to face bedside interview with this patient regarding history of present illness and determined that the patient should be evaluated in the main ED. A physical exam was not performed due to private space availability. This patient's evaluation is NOT COMPLETE and only preliminary. The full assessment, management, and reassessment of this patient, including but not limited to the follow up of ordered laboratory and radiologic testing, is deferred to the main ED provider. ***

## 2022-12-29 NOTE — ED ADULT TRIAGE NOTE - CHIEF COMPLAINT QUOTE
pt c/o nausea, bloating, "burping," and "feeling faint" x 5 hours, pt reports last BM this afternoon and has been passing "some gas," pt reports h/o bowel obstruction and similar symptoms; pt denies cp, sob, abdominal pain pt c/o nausea, bloating, "burping," and "feeling faint" x 5 hours, pt reports last BM this afternoon and has been passing "some gas," pt reports h/o bowel obstruction and similar symptoms; pt denies cp, sob, syncope, abdominal pain

## 2022-12-29 NOTE — ED PROVIDER NOTE - CLINICAL SUMMARY MEDICAL DECISION MAKING FREE TEXT BOX
62-year-old male history of hypertension diabetes chief complaint of epigastric pain GERD.  In the setting of hypotension.  Given history and physical differential diagnosis includes but is not limited to GERD versus possible ACS versus gastritis.  Will assess with labs and imaging rule out SBO.  Even the patient is passing gas.Patient has outpatient cardiology that he can follow-up with as well and does not wish to stay in the hospital for cardiac work-up.

## 2022-12-29 NOTE — ED PROVIDER NOTE - ATTENDING CONTRIBUTION TO CARE
Labs unremarkable for acute process, CT negative for acute intra-abdominal condition, EKG nonischemic, troponin negative x2, low suspicion for unusual anginal equivalent.   Patient has a nontender abdominal exam and denying pain,  So despite having cholelithiasis is not exhibiting signs of biliary colic at this time. Multiple diagnoses were considered during patient's evaluation today. While exact etiology of symptoms is unclear, there appears to be no emergent process today that would require further emergent or inpatient management.  Pt is safe for dc with outpt f/u and return instructions if symptoms worsen.

## 2022-12-29 NOTE — ED PROVIDER NOTE - PHYSICAL EXAMINATION
GENERAL: Awake, alert, NAD  HEENT: NC/AT, moist mucous membranes,   LUNGS: CTAB, no wheezes or crackles   CARDIAC: RRR, no m/r/g  ABDOMEN: Soft,  non tender, non distended, no rebound, no guarding  BACK: No midline spinal tenderness, no CVA tenderness  EXT: No edema, no calf tenderness  NEURO: A&Ox3. Moving all extremities.  SKIN: Warm and dry. No rash.  PSYCH: Normal affect.

## 2022-12-29 NOTE — ED PROVIDER NOTE - NSFOLLOWUPINSTRUCTIONS_ED_ALL_ED_FT
Today you were seen in the ED for chest pain.     It was found that you have no signs of a medical emergency on todays work up in the ED.     Please follow up with your cardiologist, if you do not have one you can reach one at    [phone] [place] [address] 4th floor   [place], [place]   [phone]  General Cardiology       Please return to the ED if you have more severe chest pain or the pain that brought you into the Emergency Dept. returns, an increase in shortness of breath, nausea and / or vomiting associated with your chest pain. Today you were seen in the ED for chest pain.     It was found that you have no signs of a medical emergency on today's work up in the ED.     A copy of the results is attached below.     Please follow up with your cardiologist, if you do not have one you can reach one at    [phone] [place] [address] 4th floor   [place], [place]   [phone]  General Cardiology       Please return to the ED if you have more severe chest pain or the pain that brought you into the Emergency Dept. returns, an increase in shortness of breath, nausea and / or vomiting associated with your chest pain.

## 2022-12-29 NOTE — ED PROVIDER NOTE - OBJECTIVE STATEMENT
62-year-old history of hypertension diabetes chief complaint nausea symptoms started today around 2:30 PM.  Patient subsequently had episode of hypotension 100/46.  Patient family member.  Patient has history of small bowel obstruction January 2021.  Otherwise denies any fevers chest pain shortness of breath nausea or vomiting.  Patient has subsequently feeling better but was concerned about SBO given the episode of pain.

## 2022-12-29 NOTE — ED PROVIDER NOTE - PATIENT PORTAL LINK FT
You can access the FollowMyHealth Patient Portal offered by [facility] by registering at the following website: [email or web address]. By joining [employer]’s FollowMyHealth portal, you will also be able to view your health information using other applications (apps) compatible with our system.

## 2022-12-30 VITALS
HEART RATE: 82 BPM | OXYGEN SATURATION: 98 % | RESPIRATION RATE: 18 BRPM | TEMPERATURE: 98 F | DIASTOLIC BLOOD PRESSURE: 78 MMHG | SYSTOLIC BLOOD PRESSURE: 136 MMHG

## 2022-12-30 LAB
ALBUMIN SERPL ELPH-MCNC: 3.9 G/DL — SIGNIFICANT CHANGE UP (ref 3.3–5)
ALP SERPL-CCNC: 52 U/L — SIGNIFICANT CHANGE UP (ref 40–120)
ALT FLD-CCNC: 25 U/L — SIGNIFICANT CHANGE UP (ref 10–45)
ANION GAP SERPL CALC-SCNC: 9 MMOL/L — SIGNIFICANT CHANGE UP (ref 5–17)
AST SERPL-CCNC: 18 U/L — SIGNIFICANT CHANGE UP (ref 10–40)
BASE EXCESS BLDV CALC-SCNC: 8 MMOL/L — HIGH (ref -2–3)
BASOPHILS # BLD AUTO: 0.04 K/UL — SIGNIFICANT CHANGE UP (ref 0–0.2)
BASOPHILS NFR BLD AUTO: 0.3 % — SIGNIFICANT CHANGE UP (ref 0–2)
BILIRUB SERPL-MCNC: 0.7 MG/DL — SIGNIFICANT CHANGE UP (ref 0.2–1.2)
BUN SERPL-MCNC: 18 MG/DL — SIGNIFICANT CHANGE UP (ref 7–23)
CA-I SERPL-SCNC: 1.26 MMOL/L — SIGNIFICANT CHANGE UP (ref 1.15–1.33)
CALCIUM SERPL-MCNC: 9.5 MG/DL — SIGNIFICANT CHANGE UP (ref 8.4–10.5)
CHLORIDE BLDV-SCNC: 100 MMOL/L — SIGNIFICANT CHANGE UP (ref 96–108)
CHLORIDE SERPL-SCNC: 101 MMOL/L — SIGNIFICANT CHANGE UP (ref 96–108)
CO2 BLDV-SCNC: 36 MMOL/L — HIGH (ref 22–26)
CO2 SERPL-SCNC: 28 MMOL/L — SIGNIFICANT CHANGE UP (ref 22–31)
CREAT SERPL-MCNC: 1 MG/DL — SIGNIFICANT CHANGE UP (ref 0.5–1.3)
EGFR: 85 ML/MIN/1.73M2 — SIGNIFICANT CHANGE UP
EOSINOPHIL # BLD AUTO: 0.33 K/UL — SIGNIFICANT CHANGE UP (ref 0–0.5)
EOSINOPHIL NFR BLD AUTO: 2.2 % — SIGNIFICANT CHANGE UP (ref 0–6)
GAS PNL BLDV: 136 MMOL/L — SIGNIFICANT CHANGE UP (ref 136–145)
GAS PNL BLDV: SIGNIFICANT CHANGE UP
GAS PNL BLDV: SIGNIFICANT CHANGE UP
GLUCOSE BLDV-MCNC: 149 MG/DL — HIGH (ref 70–99)
GLUCOSE SERPL-MCNC: 143 MG/DL — HIGH (ref 70–99)
HCO3 BLDV-SCNC: 34 MMOL/L — HIGH (ref 22–29)
HCT VFR BLD CALC: 41.9 % — SIGNIFICANT CHANGE UP (ref 39–50)
HCT VFR BLDA CALC: 42 % — SIGNIFICANT CHANGE UP (ref 39–51)
HGB BLD CALC-MCNC: 13.9 G/DL — SIGNIFICANT CHANGE UP (ref 12.6–17.4)
HGB BLD-MCNC: 13.2 G/DL — SIGNIFICANT CHANGE UP (ref 13–17)
IMM GRANULOCYTES NFR BLD AUTO: 0.5 % — SIGNIFICANT CHANGE UP (ref 0–0.9)
LACTATE BLDV-MCNC: 1.6 MMOL/L — SIGNIFICANT CHANGE UP (ref 0.5–2)
LIDOCAIN IGE QN: 25 U/L — SIGNIFICANT CHANGE UP (ref 7–60)
LYMPHOCYTES # BLD AUTO: 1.65 K/UL — SIGNIFICANT CHANGE UP (ref 1–3.3)
LYMPHOCYTES # BLD AUTO: 11.2 % — LOW (ref 13–44)
MCHC RBC-ENTMCNC: 27.1 PG — SIGNIFICANT CHANGE UP (ref 27–34)
MCHC RBC-ENTMCNC: 31.5 GM/DL — LOW (ref 32–36)
MCV RBC AUTO: 86 FL — SIGNIFICANT CHANGE UP (ref 80–100)
MONOCYTES # BLD AUTO: 0.64 K/UL — SIGNIFICANT CHANGE UP (ref 0–0.9)
MONOCYTES NFR BLD AUTO: 4.3 % — SIGNIFICANT CHANGE UP (ref 2–14)
NEUTROPHILS # BLD AUTO: 12.06 K/UL — HIGH (ref 1.8–7.4)
NEUTROPHILS NFR BLD AUTO: 81.5 % — HIGH (ref 43–77)
NRBC # BLD: 0 /100 WBCS — SIGNIFICANT CHANGE UP (ref 0–0)
PCO2 BLDV: 51 MMHG — SIGNIFICANT CHANGE UP (ref 42–55)
PH BLDV: 7.43 — SIGNIFICANT CHANGE UP (ref 7.32–7.43)
PLATELET # BLD AUTO: 395 K/UL — SIGNIFICANT CHANGE UP (ref 150–400)
PO2 BLDV: 26 MMHG — SIGNIFICANT CHANGE UP (ref 25–45)
POTASSIUM BLDV-SCNC: 4.1 MMOL/L — SIGNIFICANT CHANGE UP (ref 3.5–5.1)
POTASSIUM SERPL-MCNC: 3.9 MMOL/L — SIGNIFICANT CHANGE UP (ref 3.5–5.3)
POTASSIUM SERPL-SCNC: 3.9 MMOL/L — SIGNIFICANT CHANGE UP (ref 3.5–5.3)
PROT SERPL-MCNC: 6.9 G/DL — SIGNIFICANT CHANGE UP (ref 6–8.3)
RBC # BLD: 4.87 M/UL — SIGNIFICANT CHANGE UP (ref 4.2–5.8)
RBC # FLD: 13.5 % — SIGNIFICANT CHANGE UP (ref 10.3–14.5)
SAO2 % BLDV: 31.3 % — LOW (ref 67–88)
SODIUM SERPL-SCNC: 138 MMOL/L — SIGNIFICANT CHANGE UP (ref 135–145)
TROPONIN T, HIGH SENSITIVITY RESULT: 7 NG/L — SIGNIFICANT CHANGE UP (ref 0–51)
TROPONIN T, HIGH SENSITIVITY RESULT: <6 NG/L — SIGNIFICANT CHANGE UP (ref 0–51)
WBC # BLD: 14.79 K/UL — HIGH (ref 3.8–10.5)
WBC # FLD AUTO: 14.79 K/UL — HIGH (ref 3.8–10.5)

## 2022-12-30 PROCEDURE — 84295 ASSAY OF SERUM SODIUM: CPT

## 2022-12-30 PROCEDURE — 85014 HEMATOCRIT: CPT

## 2022-12-30 PROCEDURE — 82330 ASSAY OF CALCIUM: CPT

## 2022-12-30 PROCEDURE — 84484 ASSAY OF TROPONIN QUANT: CPT

## 2022-12-30 PROCEDURE — 80053 COMPREHEN METABOLIC PANEL: CPT

## 2022-12-30 PROCEDURE — 74177 CT ABD & PELVIS W/CONTRAST: CPT | Mod: MA

## 2022-12-30 PROCEDURE — 85018 HEMOGLOBIN: CPT

## 2022-12-30 PROCEDURE — 82947 ASSAY GLUCOSE BLOOD QUANT: CPT

## 2022-12-30 PROCEDURE — 82435 ASSAY OF BLOOD CHLORIDE: CPT

## 2022-12-30 PROCEDURE — 99285 EMERGENCY DEPT VISIT HI MDM: CPT | Mod: 25

## 2022-12-30 PROCEDURE — 82803 BLOOD GASES ANY COMBINATION: CPT

## 2022-12-30 PROCEDURE — 83605 ASSAY OF LACTIC ACID: CPT

## 2022-12-30 PROCEDURE — 74177 CT ABD & PELVIS W/CONTRAST: CPT | Mod: 26,MA

## 2022-12-30 PROCEDURE — 85025 COMPLETE CBC W/AUTO DIFF WBC: CPT

## 2022-12-30 PROCEDURE — 93005 ELECTROCARDIOGRAM TRACING: CPT

## 2022-12-30 PROCEDURE — 83690 ASSAY OF LIPASE: CPT

## 2022-12-30 PROCEDURE — 84132 ASSAY OF SERUM POTASSIUM: CPT

## 2022-12-30 NOTE — ED ADULT NURSE NOTE - OBJECTIVE STATEMENT
Pt is a 62y M c/o nausea, bloating, "heart burn" that started approx 1430 today and has since subsided. Pt states he had an episode of low bp "100/46" at home. Pt denies fever, chills, cough, sob, dizziness, ha, cp, V/D, abd pain. Pt is well appearing, A&Ox3, neuro status intact, breathing unlabored and spontaneous, abd soft nontender nondistended, full strength and ROM of all extremities. Pt resting in stretcher, bed in lowest position, family member at bedside, aware of plan of care. Comfort and safety measures maintained.

## 2022-12-30 NOTE — ED ADULT NURSE NOTE - CHIEF COMPLAINT QUOTE
pt c/o nausea, bloating, "burping," and "feeling faint" x 5 hours, pt reports last BM this afternoon and has been passing "some gas," pt reports h/o bowel obstruction and similar symptoms; pt denies cp, sob, syncope, abdominal pain

## 2022-12-30 NOTE — ED ADULT NURSE NOTE - NSIMPLEMENTINTERV_GEN_ALL_ED
Implemented All Universal Safety Interventions:  [place] to call system. Call bell, personal items and telephone within reach. Instruct patient to call for assistance. Room bathroom lighting operational. Non-slip footwear when patient is off stretcher. Physically safe environment: no spills, clutter or unnecessary equipment. Stretcher in lowest position, wheels locked, appropriate side rails in place.

## 2023-01-09 ENCOUNTER — NON-APPOINTMENT (OUTPATIENT)
Age: 63
End: 2023-01-09

## 2023-01-09 ENCOUNTER — APPOINTMENT (OUTPATIENT)
Dept: CARDIOLOGY | Facility: CLINIC | Age: 63
End: 2023-01-09
Payer: COMMERCIAL

## 2023-01-09 VITALS
OXYGEN SATURATION: 97 % | BODY MASS INDEX: 30.26 KG/M2 | SYSTOLIC BLOOD PRESSURE: 133 MMHG | HEART RATE: 80 BPM | DIASTOLIC BLOOD PRESSURE: 72 MMHG | WEIGHT: 209 LBS | HEIGHT: 69.5 IN

## 2023-01-09 DIAGNOSIS — K56.600 PARTIAL INTESTINAL OBSTRUCTION, UNSPECIFIED AS TO CAUSE: ICD-10-CM

## 2023-01-09 DIAGNOSIS — I44.4 LEFT ANTERIOR FASCICULAR BLOCK: ICD-10-CM

## 2023-01-09 DIAGNOSIS — R94.31 ABNORMAL ELECTROCARDIOGRAM [ECG] [EKG]: ICD-10-CM

## 2023-01-09 PROCEDURE — 99214 OFFICE O/P EST MOD 30 MIN: CPT

## 2023-01-09 PROCEDURE — 93000 ELECTROCARDIOGRAM COMPLETE: CPT

## 2023-01-09 NOTE — HISTORY OF PRESENT ILLNESS
[FreeTextEntry1] : And since schedule follow-up after [facility] [place] [place] ED visit on December 29, 2022.  Interval history markable episode of COVID-19 infection in July 2022.  Around Thanksgiving of last year he had a sustained parainfluenza.  Thereafter he tried Ozempic significant abdominal distress thereafter and lost 15 pounds in conjunction with reduced appetite.\par \par On December 29, he began to experience reflux symptoms in conjunction with abdominal bloating and distention.  He had a sense that a small bowel obstruction was recurring.  He became lightheaded and with a sense of near syncope.  His son is a PA student and checked his blood pressure and found to be 100/48.  He drove him to [facility] ED where white count was elevated 14,000.79 but laboratory studies were otherwise unremarkable including troponin Ts of 7 and less than 6.  Creatinine was 1.0.  CT of the abdomen did not reveal any acute pathology.  ECG revealed normal sinus rhythm with left anterior hemiblock and right IVCD without change in comparison to his prior tracings.  He felt better almost immediately while drinking the oral contrast he was discharged home.\par \par Since discharge he has markedly restricted his take a simple carbohydrates, is eating smaller quantities and making an extreme effort to chew his food carefully.  No recurrence of nausea or vomiting.  He has also resumed exercise which he is tolerating well without any effort provoked symptoms.\par No c/o chest, throat,jaw, arm or upper back discomfort.  No dyspnea, orthopnea or PND.  No palpitations, dizziness or syncope.  No edema or claudication.\par \par

## 2023-02-03 DIAGNOSIS — Z01.818 ENCOUNTER FOR OTHER PREPROCEDURAL EXAMINATION: ICD-10-CM

## 2023-02-13 ENCOUNTER — LABORATORY RESULT (OUTPATIENT)
Age: 63
End: 2023-02-13

## 2023-02-13 ENCOUNTER — APPOINTMENT (OUTPATIENT)
Dept: GASTROENTEROLOGY | Facility: CLINIC | Age: 63
End: 2023-02-13
Payer: COMMERCIAL

## 2023-02-13 PROCEDURE — 43239 EGD BIOPSY SINGLE/MULTIPLE: CPT | Mod: 59

## 2023-02-13 PROCEDURE — A4550 SURGICAL TRAYS: CPT

## 2023-02-13 PROCEDURE — 45380 COLONOSCOPY AND BIOPSY: CPT | Mod: 33

## 2023-02-15 ENCOUNTER — NON-APPOINTMENT (OUTPATIENT)
Age: 63
End: 2023-02-15

## 2023-02-16 ENCOUNTER — NON-APPOINTMENT (OUTPATIENT)
Age: 63
End: 2023-02-16

## 2023-02-17 ENCOUNTER — NON-APPOINTMENT (OUTPATIENT)
Age: 63
End: 2023-02-17

## 2023-02-21 ENCOUNTER — NON-APPOINTMENT (OUTPATIENT)
Age: 63
End: 2023-02-21

## 2023-03-01 ENCOUNTER — APPOINTMENT (OUTPATIENT)
Dept: OTOLARYNGOLOGY | Facility: CLINIC | Age: 63
End: 2023-03-01
Payer: COMMERCIAL

## 2023-03-01 DIAGNOSIS — Z87.09 PERSONAL HISTORY OF OTHER DISEASES OF THE RESPIRATORY SYSTEM: ICD-10-CM

## 2023-03-01 DIAGNOSIS — K13.79 OTHER LESIONS OF ORAL MUCOSA: ICD-10-CM

## 2023-03-01 DIAGNOSIS — R07.0 PAIN IN THROAT: ICD-10-CM

## 2023-03-01 DIAGNOSIS — R73.03 PREDIABETES.: ICD-10-CM

## 2023-03-01 DIAGNOSIS — R43.0 ANOSMIA: ICD-10-CM

## 2023-03-01 DIAGNOSIS — R13.10 DYSPHAGIA, UNSPECIFIED: ICD-10-CM

## 2023-03-01 DIAGNOSIS — Z86.59 PERSONAL HISTORY OF OTHER MENTAL AND BEHAVIORAL DISORDERS: ICD-10-CM

## 2023-03-01 DIAGNOSIS — Z86.39 PERSONAL HISTORY OF OTHER ENDOCRINE, NUTRITIONAL AND METABOLIC DISEASE: ICD-10-CM

## 2023-03-01 DIAGNOSIS — R49.0 DYSPHONIA: ICD-10-CM

## 2023-03-01 PROCEDURE — 99204 OFFICE O/P NEW MOD 45 MIN: CPT | Mod: 25

## 2023-03-01 PROCEDURE — 31575 DIAGNOSTIC LARYNGOSCOPY: CPT

## 2023-03-01 RX ORDER — FLUTICASONE PROPIONATE 50 MCG
SPRAY, SUSPENSION NASAL
Refills: 0 | Status: ACTIVE | COMMUNITY

## 2023-03-01 RX ORDER — FLUCONAZOLE 100 MG/1
100 TABLET ORAL DAILY
Qty: 7 | Refills: 0 | Status: COMPLETED | COMMUNITY
Start: 2023-02-21 | End: 2023-03-01

## 2023-03-01 RX ORDER — ERGOCALCIFEROL 1.25 MG/1
1.25 MG CAPSULE, LIQUID FILLED ORAL
Refills: 0 | Status: ACTIVE | COMMUNITY

## 2023-03-01 RX ORDER — LORATADINE 5 MG/5 ML
SOLUTION, ORAL ORAL
Refills: 0 | Status: ACTIVE | COMMUNITY

## 2023-03-01 NOTE — ASSESSMENT
[FreeTextEntry1] : Pt with no findings to explain his anosmia or throat pain.  Will get CT neck and f/u.

## 2023-03-01 NOTE — HISTORY OF PRESENT ILLNESS
[de-identified] : 62 year old male referred by Dr Carlson for intermittently inflamed and enlarged uvula.  States has also had  intermittent R throat pain when he swallows for the past three weeks which gets worse at night. It is associated with a dry cough.  Dr Carlson gave Azithromycin and steroids with slight relief was advised uvula is inflamed could be cause of throat pain and has voice hoarseness. Currently using Flonase and Claritin as needed during seasonal changes, and uses Pepto Bismol or Tums as needed for acid reflux. Denies dysphagia, dyspnea, night sweats, chills, fevers, or otalgia.\par \par Pt also reports a h/o anosmia over the past few years.  He reports no h/o trauma.

## 2023-03-01 NOTE — CONSULT LETTER
[Consult Letter:] : I had the pleasure of evaluating your patient, [unfilled]. [Please see my note below.] : Please see my note below. [Consult Closing:] : Thank you very much for allowing me to participate in the care of this patient.  If you have any questions, please do not hesitate to contact me. [Sincerely,] : Sincerely, [Dear  ___] : Dear  [unfilled], [FreeTextEntry2] : Paulino Carlson MD [FreeTextEntry3] : Jey Gonzalez MD, FACS\par Chief of Otolaryngology and Head & Neck Surgery\par [facility]\par  - Dept. of Otolaryngology\par [facility]

## 2023-03-01 NOTE — REASON FOR VISIT
[Initial Consultation] : an initial consultation for [FreeTextEntry2] : inflamed uvula and R throat pain

## 2023-03-01 NOTE — PROCEDURE
[Unable to Cooperate with Mirror] : patient unable to cooperate with mirror [Hoarseness] : hoarseness not clearly evaluated by indirect laryngoscopy [Flexible Endoscope] : examined with the flexible endoscope [True Vocal Cords Paralysis] : no true vocal cord paralysis [True Vocal Cords Erythematous] : no true vocal cord edema [True Vocal Cords Cross's Nodules] : no true vocal cord nodules [Glottis Arytenoid Cartilages] : no arytenoid granulomas [Glottis Arytenoid Cartilages Erythema] : no arytenoid erythema [Normal] : posterior cricoid area had healthy pink mucosa in the interarytenoid area and the esophageal inlet [de-identified] : throat pain

## 2023-03-01 NOTE — PHYSICAL EXAM
[] : septum deviated to the left [Midline] : trachea located in midline position [Normal] : no rashes [de-identified] : BOT negative on palpation. [de-identified] : Bifid uvula present

## 2023-03-08 ENCOUNTER — OUTPATIENT (OUTPATIENT)
Dept: OUTPATIENT SERVICES | Facility: HOSPITAL | Age: 63
LOS: 1 days | End: 2023-03-08
Payer: COMMERCIAL

## 2023-03-08 ENCOUNTER — APPOINTMENT (OUTPATIENT)
Dept: CT IMAGING | Facility: CLINIC | Age: 63
End: 2023-03-08
Payer: COMMERCIAL

## 2023-03-08 DIAGNOSIS — R13.10 DYSPHAGIA, UNSPECIFIED: ICD-10-CM

## 2023-03-08 DIAGNOSIS — K42.9 UMBILICAL HERNIA WITHOUT OBSTRUCTION OR GANGRENE: Chronic | ICD-10-CM

## 2023-03-08 DIAGNOSIS — Z90.79 ACQUIRED ABSENCE OF OTHER GENITAL ORGAN(S): Chronic | ICD-10-CM

## 2023-03-08 PROCEDURE — 70491 CT SOFT TISSUE NECK W/DYE: CPT

## 2023-03-08 PROCEDURE — 70491 CT SOFT TISSUE NECK W/DYE: CPT | Mod: 26

## 2023-03-20 ENCOUNTER — NON-APPOINTMENT (OUTPATIENT)
Age: 63
End: 2023-03-20

## 2023-03-20 DIAGNOSIS — D43.3: ICD-10-CM

## 2023-03-24 ENCOUNTER — APPOINTMENT (OUTPATIENT)
Dept: NEUROSURGERY | Facility: CLINIC | Age: 63
End: 2023-03-24
Payer: COMMERCIAL

## 2023-03-24 ENCOUNTER — NON-APPOINTMENT (OUTPATIENT)
Age: 63
End: 2023-03-24

## 2023-03-24 VITALS
BODY MASS INDEX: 29.49 KG/M2 | HEART RATE: 76 BPM | TEMPERATURE: 97 F | RESPIRATION RATE: 18 BRPM | WEIGHT: 206 LBS | SYSTOLIC BLOOD PRESSURE: 159 MMHG | OXYGEN SATURATION: 98 % | HEIGHT: 70 IN | DIASTOLIC BLOOD PRESSURE: 82 MMHG

## 2023-03-24 PROCEDURE — 99204 OFFICE O/P NEW MOD 45 MIN: CPT

## 2023-03-24 NOTE — DATA REVIEWED
[de-identified] : CT neck demonstrates 3.1X 2.7X2.8 cm anterior skull bas mass extending through ethmoid sinus

## 2023-03-24 NOTE — ASSESSMENT
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   The patient’s established problem of anosmia is secondary to this mass.  The differential diagnosis is meningioma.  I had a long discussion with the patient regarding the role of proceeding with an MRI with and without contrast.  The patient was extensively educated about the nature of his disease process.  The patient should see his ophthalmologist for baseline acuity testing.  I will see the patient back after MRI via telehealth next Friday. I have explained the alternatives, risks and benefits to the patient and he understands and agrees to proceed.  This risk of the procedure including but not limited to pain, infection, seizure, stroke, recurrence, residual disease, neurovascular injury, heart attack, pulmonary embolism, blindness, weakness, paralysis and death have been carefully explained to the patient who clearly understands and agrees to proceed.\par

## 2023-03-24 NOTE — HISTORY OF PRESENT ILLNESS
[FreeTextEntry1] : 63 year old male with history of loss of smell for 10 years. Initially presented to ENT with dysphagia and uvula swelling. Underwent noncontrast head CT an found to have skull bas mass for which he presents for evaluation today. \par He denies any visual complaints or cognitive decline. he is an  and working normally but does report occasional short term memory decline.

## 2023-03-24 NOTE — PHYSICAL EXAM
[General Appearance - Alert] : alert [General Appearance - In No Acute Distress] : in no acute distress [Person] : oriented to person [Place] : oriented to place [Time] : oriented to time [Cranial Nerves Optic (II)] : visual acuity intact bilaterally,  pupils equal round and reactive to light [Cranial Nerves Oculomotor (III)] : extraocular motion intact [Cranial Nerves Trigeminal (V)] : facial sensation intact symmetrically [Cranial Nerves Facial (VII)] : face symmetrical [Cranial Nerves Vestibulocochlear (VIII)] : hearing was intact bilaterally [Cranial Nerves Glossopharyngeal (IX)] : tongue and palate midline [Cranial Nerves Accessory (XI - Cranial And Spinal)] : head turning and shoulder shrug symmetric [Cranial Nerves Hypoglossal (XII)] : there was no tongue deviation with protrusion [Motor Tone] : muscle tone was normal in all four extremities [Motor Strength] : muscle strength was normal in all four extremities [Abnormal Walk] : normal gait [Balance] : balance was intact

## 2023-03-27 ENCOUNTER — APPOINTMENT (OUTPATIENT)
Dept: MRI IMAGING | Facility: CLINIC | Age: 63
End: 2023-03-27
Payer: COMMERCIAL

## 2023-03-27 ENCOUNTER — OUTPATIENT (OUTPATIENT)
Dept: OUTPATIENT SERVICES | Facility: HOSPITAL | Age: 63
LOS: 1 days | End: 2023-03-27
Payer: COMMERCIAL

## 2023-03-27 DIAGNOSIS — Z90.79 ACQUIRED ABSENCE OF OTHER GENITAL ORGAN(S): Chronic | ICD-10-CM

## 2023-03-27 DIAGNOSIS — K42.9 UMBILICAL HERNIA WITHOUT OBSTRUCTION OR GANGRENE: Chronic | ICD-10-CM

## 2023-03-27 DIAGNOSIS — D43.3 NEOPLASM OF UNCERTAIN BEHAVIOR OF CRANIAL NERVES: ICD-10-CM

## 2023-03-27 PROCEDURE — 70552 MRI BRAIN STEM W/DYE: CPT

## 2023-03-27 PROCEDURE — 70542 MRI ORBIT/FACE/NECK W/DYE: CPT

## 2023-03-27 PROCEDURE — 70552 MRI BRAIN STEM W/DYE: CPT | Mod: 26

## 2023-03-27 PROCEDURE — A9585: CPT

## 2023-03-27 PROCEDURE — 70542 MRI ORBIT/FACE/NECK W/DYE: CPT | Mod: 26

## 2023-03-29 ENCOUNTER — TRANSCRIPTION ENCOUNTER (OUTPATIENT)
Age: 63
End: 2023-03-29

## 2023-03-29 ENCOUNTER — APPOINTMENT (OUTPATIENT)
Dept: NEUROSURGERY | Facility: CLINIC | Age: 63
End: 2023-03-29
Payer: COMMERCIAL

## 2023-03-29 DIAGNOSIS — D32.0 BENIGN NEOPLASM OF CEREBRAL MENINGES: ICD-10-CM

## 2023-03-29 PROCEDURE — 99214 OFFICE O/P EST MOD 30 MIN: CPT | Mod: 95

## 2023-03-29 NOTE — DATA REVIEWED
[de-identified] : I have reviewed the most recent MRI which shows an anterior skull base lesion measuring 3 X 2.7 X 2.7 cm with extension into the ethmoid sinus consistent with meningioma

## 2023-03-29 NOTE — HISTORY OF PRESENT ILLNESS
[FreeTextEntry1] : 63 year old male with history of loss of smell for 10 years. Initially presented to ENT with dysphagia and uvula swelling. Underwent noncontrast head CT an found to have skull bas mass for which he presents for evaluation today. \par He denies any visual complaints or cognitive decline. he is an  and working normally but does report occasional short term memory decline. \par \par He presents today via telehealth to review MRI.

## 2023-03-29 NOTE — ASSESSMENT
[FreeTextEntry1] : My impression is that the patient suffers from a newly diagnosed anterior skull base mass. The patient’s established problem of anosmia is secondary to this mass.  The differential diagnosis is meningioma.  I had a long discussion with the patient regarding the role of proceeding with an MRI with and without contrast in 3 months.  The patient was extensively educated about the nature of his disease process.  The patient should see his ophthalmologist for baseline acuity testing.I have explained the alternatives, risks and benefits to the patient and he understands and agrees to proceed.  This risk of the procedure including but not limited to pain, infection, seizure, stroke, recurrence, residual disease, neurovascular injury, heart attack, pulmonary embolism, blindness, weakness, paralysis and death have been carefully explained to the patient who clearly understands and agrees to proceed.\par

## 2023-04-02 ENCOUNTER — NON-APPOINTMENT (OUTPATIENT)
Age: 63
End: 2023-04-02

## 2023-04-07 ENCOUNTER — NON-APPOINTMENT (OUTPATIENT)
Age: 63
End: 2023-04-07

## 2023-04-13 ENCOUNTER — APPOINTMENT (OUTPATIENT)
Dept: ENDOCRINOLOGY | Facility: CLINIC | Age: 63
End: 2023-04-13
Payer: COMMERCIAL

## 2023-04-13 VITALS
WEIGHT: 207 LBS | BODY MASS INDEX: 29.63 KG/M2 | SYSTOLIC BLOOD PRESSURE: 124 MMHG | RESPIRATION RATE: 14 BRPM | HEART RATE: 84 BPM | OXYGEN SATURATION: 98 % | DIASTOLIC BLOOD PRESSURE: 82 MMHG | HEIGHT: 70 IN

## 2023-04-13 DIAGNOSIS — Z78.9 OTHER SPECIFIED HEALTH STATUS: ICD-10-CM

## 2023-04-13 DIAGNOSIS — E11.9 TYPE 2 DIABETES MELLITUS W/OUT COMPLICATIONS: ICD-10-CM

## 2023-04-13 DIAGNOSIS — Z82.49 FAMILY HISTORY OF ISCHEMIC HEART DISEASE AND OTHER DISEASES OF THE CIRCULATORY SYSTEM: ICD-10-CM

## 2023-04-13 LAB — HBA1C MFR BLD HPLC: 6.1

## 2023-04-13 PROCEDURE — 83036 HEMOGLOBIN GLYCOSYLATED A1C: CPT | Mod: QW

## 2023-04-13 PROCEDURE — 99205 OFFICE O/P NEW HI 60 MIN: CPT

## 2023-04-13 NOTE — ASSESSMENT
[FreeTextEntry1] : Patient is a 63 M with history of HTN, HLD, T2D, anosmia here to establish care for T2D. \par \par # T2D \par - HgB A1C: 6.1 at goal\par - Complications: none \par - Aspirin: no\par - Most recent urine microalbumin   check today   . ACE-I/ARB: no\par - Most recent LDL: n/a   . On statin: yes \par - Opthalmology up to date: no,  advised for yearly check up\par - Podiatry up to date: no advised for yearly check up\par - Patient to call for persistent glucose < 70 or > 300\par - Patient counseled on the importance of consistent carbohydrate diet and regular physical activity \par - Advised patient to continue checking FSG and to bring meter to all visits \par - Symptoms of hypoglycemia discussed\par - Medications changes: \par - Continue with Metformin 1500 mg daily \par - Start Ozempic 0.25 mg weekly for weight loss effect. For GLP1A, I discussed potential benefits for patients with clinical ASCVD as well as reduced the risk of cardiovascular mortality.  I discussed side effects of the GLP-1 agonist including pancreatitis, nausea or vomiting, injection site reactions and benefits including potential for weight loss and glycemic benefits. Patient confirmed that they have no history of pancreatitis and no family history of  thyroid cancer. Explained also that GLP1RA will help with his food craving.  \par \par # HTN\par - BP today 124/82\par \par # HLD\par - Most recent LDL done at PCP office, patient will fax it over\par - Continue with Crestor 10 mg daily \par \par # Class 1 obesity \par - Start Ozempic 0.25 mg weekly as above, if tolerate, will increase to 0.5 mg weekly \par - We also discussed dietary choices including substiting added sweetner products such as ice cream and cake for natural sweets like fruit \par \par FOLLOW UP: I recommend that next visit for diabetes care be in 6 month\par \par To do at next clinic visit\par - Titrate ozempic\par \par Sherly Wagner MD\par Endocrinology, Diabetes and Metabolism\par [address]\[place] [address]\par Tel [phone]\par Fax [phone]\par

## 2023-04-13 NOTE — HISTORY OF PRESENT ILLNESS
[FreeTextEntry1] : Patient is a 63 M with history of HTN, HLD, T2D, anosmia here to establish care for T2D. \par \par FH: no family history of diabetes\par SH: denies smoking, alcohol \par \par #  T2DM:\par Diabetes history:\par Diagnosed for a few year initially with prediabetes \par \par Most recent A1C 6.1 today  before 6.7 1-2 months ago \par \par Diabetes complications:\par Neuropathy: no\par Retinopathy: none (last eye exam 2 years ago)\par Nephropathy: none (most recent Cr, GFR)\par CAD/MI/CVA:none \par \par Current DM medications: \par - Metformin 500 mg TID, previously on BID 1 month ago\par \par Past DM medications: \par - Ozempic 1 mg weekly before lost 18 pounds but did not feel well. Had nausea in the morning. \par \par Finger sticks: \par does not check sugar \par \par Diet: \par Breakfast: cottage cheese, avocado \par Lunch: chicken cutlet, steamed vegetables, fish , rice or noodle potato\par Dinner: wife cooks, salad, scrambled eggs \par Snacks: cookies, chocolate ice cream, cake \par Drinks: coffee with sweet and low and stevia \par Exercise: push up sit up \par \par # HTN\par BP today 124/82\par \par # HLD\par - Crestor 10 mg daily \par \par 4/13/2023 visit events: stressed about he has high sugar craving. Said that without the ozempic, his intense craving for food has now returned. He feels like it is difficult for him to get satisfied. \par \par

## 2023-04-14 LAB
CREAT SPEC-SCNC: 173 MG/DL
MICROALBUMIN 24H UR DL<=1MG/L-MCNC: 1.7 MG/DL
MICROALBUMIN/CREAT 24H UR-RTO: 10 MG/G

## 2023-04-26 ENCOUNTER — APPOINTMENT (OUTPATIENT)
Dept: GASTROENTEROLOGY | Facility: CLINIC | Age: 63
End: 2023-04-26
Payer: COMMERCIAL

## 2023-04-26 VITALS
SYSTOLIC BLOOD PRESSURE: 144 MMHG | BODY MASS INDEX: 29.92 KG/M2 | WEIGHT: 209 LBS | DIASTOLIC BLOOD PRESSURE: 69 MMHG | HEIGHT: 70 IN | HEART RATE: 79 BPM

## 2023-04-26 DIAGNOSIS — R14.2 ERUCTATION: ICD-10-CM

## 2023-04-26 DIAGNOSIS — B37.81 CANDIDAL ESOPHAGITIS: ICD-10-CM

## 2023-04-26 PROCEDURE — 99213 OFFICE O/P EST LOW 20 MIN: CPT

## 2023-04-26 NOTE — REASON FOR VISIT
[Follow-up] : a follow-up of an existing diagnosis [FreeTextEntry1] : Result post colonoscopy and endoscopy exam

## 2023-04-26 NOTE — ASSESSMENT
[FreeTextEntry1] : 1.  Candidal esophagitis had EGD February 2023--predisposed because of recurrent small bowel obstructions, diabetes, etc.\par 2.  History of colonic polyps, including multiple tubular adenomas, with suboptimal prep, hyperplastic polyps and hemorrhoids at last colonoscopy February 2023.\par 3.  Status post small bowel obstructions 2020, statistically likely from adhesions.\par 4.  History of anal fistula, status post surgery. Intermittent pruritus ani and soiling.\par 5.  Cholelithiasis.  \par 6.  Obesity.\par 7.  Type 2 diabetes mellitus.\par 8.  Obstructive sleep apnea.\par 9.  Cerebral meningioma.\par 10.  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 Manriquez.\par 11.  Hyperlipidemia.\par 12. History of depression.\par 13. Status post umbilical herniorrhaphy, radical prostatectomy 2016.  \par \par Plan:\par 1.  Interim medical records reviewed.\par 2.  Continue low fiber/low residue diet.  Can add Benefiber.\par 3.  If nausea bloating become problematic, add ondansetron.\par 4.  Return within 2-3 years, for consideration of surveillance colonoscopy, with better prep at that time.

## 2023-04-26 NOTE — HISTORY OF PRESENT ILLNESS
[FreeTextEntry1] : EGD 2/13/2023 revealed Candidal esophagitis, for which he was given a one-week course of fluconazole.  Repeat colonoscopy 2/13/2023 revealed two hyperplastic rectal polyp and hemorrhoids, although prep was suboptimal (for which repeat colonoscopy was advised within three years hence).  Since last visit, he was diagnosed with meningioma.  He was contemplating resuming low-dose Ozempic because of type 2 diabetes mellitus and also for weight control, appetite suppression.  He has been taking probiotic regularly, along with low fiber diet.

## 2023-04-26 NOTE — CONSULT LETTER
[Dear  ___] : Dear  [unfilled], [Courtesy Letter:] : I had the pleasure of seeing your patient, [unfilled], in my office today. [Please see my note below.] : Please see my note below. [Consult Closing:] : Thank you very much for allowing me to participate in the care of this patient.  If you have any questions, please do not hesitate to contact me. [Sincerely,] : Sincerely, [FreeTextEntry3] : Vance James M.D.\par

## 2023-05-11 ENCOUNTER — APPOINTMENT (OUTPATIENT)
Dept: ENDOCRINOLOGY | Facility: CLINIC | Age: 63
End: 2023-05-11

## 2023-06-30 ENCOUNTER — OUTPATIENT (OUTPATIENT)
Dept: OUTPATIENT SERVICES | Facility: HOSPITAL | Age: 63
LOS: 1 days | End: 2023-06-30
Payer: COMMERCIAL

## 2023-06-30 ENCOUNTER — APPOINTMENT (OUTPATIENT)
Dept: MRI IMAGING | Facility: CLINIC | Age: 63
End: 2023-06-30
Payer: COMMERCIAL

## 2023-06-30 DIAGNOSIS — K42.9 UMBILICAL HERNIA WITHOUT OBSTRUCTION OR GANGRENE: Chronic | ICD-10-CM

## 2023-06-30 DIAGNOSIS — Z90.79 ACQUIRED ABSENCE OF OTHER GENITAL ORGAN(S): Chronic | ICD-10-CM

## 2023-06-30 DIAGNOSIS — D32.0 BENIGN NEOPLASM OF CEREBRAL MENINGES: ICD-10-CM

## 2023-06-30 PROCEDURE — 70543 MRI ORBT/FAC/NCK W/O &W/DYE: CPT | Mod: 26

## 2023-06-30 PROCEDURE — 70553 MRI BRAIN STEM W/O & W/DYE: CPT | Mod: 26

## 2023-06-30 PROCEDURE — 70543 MRI ORBT/FAC/NCK W/O &W/DYE: CPT

## 2023-06-30 PROCEDURE — A9585: CPT

## 2023-06-30 PROCEDURE — 70553 MRI BRAIN STEM W/O & W/DYE: CPT

## 2023-07-10 ENCOUNTER — APPOINTMENT (OUTPATIENT)
Dept: CARDIOLOGY | Facility: CLINIC | Age: 63
End: 2023-07-10

## 2023-07-13 ENCOUNTER — APPOINTMENT (OUTPATIENT)
Dept: ORTHOPEDIC SURGERY | Facility: CLINIC | Age: 63
End: 2023-07-13
Payer: COMMERCIAL

## 2023-07-13 VITALS — BODY MASS INDEX: 29.2 KG/M2 | WEIGHT: 204 LBS | HEIGHT: 70 IN

## 2023-07-13 DIAGNOSIS — M54.50 LOW BACK PAIN, UNSPECIFIED: ICD-10-CM

## 2023-07-13 DIAGNOSIS — M54.6 PAIN IN THORACIC SPINE: ICD-10-CM

## 2023-07-13 DIAGNOSIS — M51.34 OTHER INTERVERTEBRAL DISC DEGENERATION, THORACIC REGION: ICD-10-CM

## 2023-07-13 DIAGNOSIS — M51.36 OTHER INTERVERTEBRAL DISC DEGENERATION, LUMBAR REGION: ICD-10-CM

## 2023-07-13 PROCEDURE — 72100 X-RAY EXAM L-S SPINE 2/3 VWS: CPT

## 2023-07-13 PROCEDURE — 99204 OFFICE O/P NEW MOD 45 MIN: CPT

## 2023-07-13 PROCEDURE — 72070 X-RAY EXAM THORAC SPINE 2VWS: CPT

## 2023-07-13 NOTE — ADDENDUM
[FreeTextEntry1] : This note was written by Pito Ortiz on 07/13/2023 acting as scribe for Dr. Jamie Kincaid M.D.\par \par I, Jamie Kincaid MD, have read and attest that all the information, medical decision making and discharge instructions within are true and accurate.

## 2023-07-13 NOTE — PHYSICAL EXAM
[Normal] : Gait: normal [de-identified] : 5 out of 5 motor strength, sensation is intact and symmetrical full range of motion flexion extension and rotation, no palpatory tenderness full range of motion of hips knees shoulders and elbows (all four extremities), no atrophy, negative straight leg raise, no pathological reflexes, no swelling, normal ambulation, no apparent distress skin is intact, no palpable lymph nodes, no upper or lower extremity instability, alert and oriented x3 and normal mood. Normal finger-to nose test.  [de-identified] : XR AP Lat Lumbar 07/13/2023 -mild lumbar disc degenerative disease- reviewed with the patient. \par XR AP Lat Thoracic 07/13/2023 -mild thoracic degenerative disc disease- reviewed with the patient.

## 2023-07-13 NOTE — HISTORY OF PRESENT ILLNESS
[Stable] : stable [de-identified] : 63 year old male presents for initial evaluation of lower back pain.\par He has a few weeks of right lumbar pain and 2 months of right thoracic pain. \par He did have a fall a year ago that caused him to have right thoracic pain. \par No fever, chills, sweats, nausea/vomiting. No bowel or bladder dysfunction, no recent weight loss or gain. No night pain. This history is in addition to the intake form that I personally reviewed.

## 2023-07-13 NOTE — DISCUSSION/SUMMARY
[de-identified] : Mild thoracic degenerative disc disease. \par Mild lumbar disc degenerative disease.\par Discussed all options.\par Diclofenac PRN.\par Lumbar HEP.\par Lumbar MRI referral\par F/U after MRI.\par All options discussed including rest, medicine, home exercise, acupuncture, Chiropractic care, Physical Therapy, Pain management, and last resort surgery. All questions were answered, all alternatives discussed and the patient is in complete agreement with the treatment plan which the patient contributed to and discussed with me through the shared decision making process. Follow-up appointment as instructed. Any issues and the patient will call or come in sooner.

## 2023-08-08 ENCOUNTER — APPOINTMENT (OUTPATIENT)
Dept: GASTROENTEROLOGY | Facility: CLINIC | Age: 63
End: 2023-08-08
Payer: COMMERCIAL

## 2023-08-08 VITALS
HEART RATE: 83 BPM | DIASTOLIC BLOOD PRESSURE: 70 MMHG | HEIGHT: 70 IN | WEIGHT: 204 LBS | SYSTOLIC BLOOD PRESSURE: 122 MMHG | BODY MASS INDEX: 29.2 KG/M2

## 2023-08-08 DIAGNOSIS — E66.3 OVERWEIGHT: ICD-10-CM

## 2023-08-08 DIAGNOSIS — D50.9 IRON DEFICIENCY ANEMIA, UNSPECIFIED: ICD-10-CM

## 2023-08-08 DIAGNOSIS — Z86.010 PERSONAL HISTORY OF COLONIC POLYPS: ICD-10-CM

## 2023-08-08 DIAGNOSIS — R11.0 NAUSEA: ICD-10-CM

## 2023-08-08 DIAGNOSIS — E66.9 OBESITY, UNSPECIFIED: ICD-10-CM

## 2023-08-08 PROCEDURE — 99214 OFFICE O/P EST MOD 30 MIN: CPT

## 2023-08-08 NOTE — REVIEW OF SYSTEMS
[Anxiety] : anxiety [Depression] : depression [Negative] : Heme/Lymph [As Noted in HPI] : as noted in HPI [Melena (black stool)] : no melena

## 2023-08-08 NOTE — ASSESSMENT
[FreeTextEntry1] : 1.  Iron deficiency anemia, occasional darker stools--rule out small bowel source, such as AVMs, Crohn disease (note history of anal fistula), neoplasm. 2.  Candidal esophagitis at EGD February 2023. 3.  History of colonic polyps, including multiple tubular adenomas, with suboptimal prep, hyperplastic polyps and hemorrhoids at last colonoscopy February 2023. 4.  Status post small bowel obstructions 2020, statistically likely from adhesions. 5.  History of anal fistula, status post surgery. Intermittent pruritus ani and soiling. 6.  Cholelithiasis. 7.  Overweight. 8.  Type 2 diabetes mellitus. 9.  Obstructive sleep apnea. 10.  Cerebral meningioma. 11.  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 Manriquez. 12.  Hyperlipidemia. 13. History of depression. 14. Status post umbilical herniorrhaphy, radical prostatectomy 2016.    Plan: 1.  Interim medical records reviewed. 2.  Schedule repeat capsule endoscopy of the small intestine--hold Ozempic and oral iron for 1 week prior.  Metoclopramide 10 mg AC 3 times daily x 6 doses prior to the capsule. Procedure, rationale, alternatives (including enterography), material risks, and prep instructions were reviewed and brochure given. 3.  Other recommendations depend on findings of capsule endoscopy.

## 2023-08-08 NOTE — CONSULT LETTER
[Dear  ___] : Dear  [unfilled], [Courtesy Letter:] : I had the pleasure of seeing your patient, [unfilled], in my office today. [Please see my note below.] : Please see my note below. [Consult Closing:] : Thank you very much for allowing me to participate in the care of this patient.  If you have any questions, please do not hesitate to contact me. [Sincerely,] : Sincerely, [FreeTextEntry3] : Vance James M.D.

## 2023-08-16 ENCOUNTER — NON-APPOINTMENT (OUTPATIENT)
Age: 63
End: 2023-08-16

## 2023-09-06 ENCOUNTER — APPOINTMENT (OUTPATIENT)
Dept: GASTROENTEROLOGY | Facility: CLINIC | Age: 63
End: 2023-09-06
Payer: COMMERCIAL

## 2023-09-06 PROCEDURE — 91110 GI TRC IMG INTRAL ESOPH-ILE: CPT

## 2023-09-11 ENCOUNTER — NON-APPOINTMENT (OUTPATIENT)
Age: 63
End: 2023-09-11

## 2023-09-18 ENCOUNTER — RX RENEWAL (OUTPATIENT)
Age: 63
End: 2023-09-18

## 2023-10-20 ENCOUNTER — APPOINTMENT (OUTPATIENT)
Dept: ENDOCRINOLOGY | Facility: CLINIC | Age: 63
End: 2023-10-20

## 2023-10-24 ENCOUNTER — NON-APPOINTMENT (OUTPATIENT)
Age: 63
End: 2023-10-24

## 2023-10-26 ENCOUNTER — NON-APPOINTMENT (OUTPATIENT)
Age: 63
End: 2023-10-26

## 2023-10-27 ENCOUNTER — NON-APPOINTMENT (OUTPATIENT)
Age: 63
End: 2023-10-27

## 2023-10-28 ENCOUNTER — NON-APPOINTMENT (OUTPATIENT)
Age: 63
End: 2023-10-28

## 2023-11-07 ENCOUNTER — NON-APPOINTMENT (OUTPATIENT)
Age: 63
End: 2023-11-07

## 2023-11-15 ENCOUNTER — RX RENEWAL (OUTPATIENT)
Age: 63
End: 2023-11-15

## 2023-11-15 RX ORDER — DICLOFENAC SODIUM 75 MG/1
75 TABLET, DELAYED RELEASE ORAL
Qty: 60 | Refills: 1 | Status: ACTIVE | COMMUNITY
Start: 2023-07-13 | End: 1900-01-01

## 2023-12-24 ENCOUNTER — APPOINTMENT (OUTPATIENT)
Dept: MRI IMAGING | Facility: CLINIC | Age: 63
End: 2023-12-24
Payer: COMMERCIAL

## 2023-12-24 ENCOUNTER — OUTPATIENT (OUTPATIENT)
Dept: OUTPATIENT SERVICES | Facility: HOSPITAL | Age: 63
LOS: 1 days | End: 2023-12-24
Payer: COMMERCIAL

## 2023-12-24 DIAGNOSIS — M54.50 LOW BACK PAIN, UNSPECIFIED: ICD-10-CM

## 2023-12-24 DIAGNOSIS — Z90.79 ACQUIRED ABSENCE OF OTHER GENITAL ORGAN(S): Chronic | ICD-10-CM

## 2023-12-24 DIAGNOSIS — K42.9 UMBILICAL HERNIA WITHOUT OBSTRUCTION OR GANGRENE: Chronic | ICD-10-CM

## 2023-12-24 DIAGNOSIS — D32.9 BENIGN NEOPLASM OF MENINGES, UNSPECIFIED: ICD-10-CM

## 2023-12-24 DIAGNOSIS — Z00.8 ENCOUNTER FOR OTHER GENERAL EXAMINATION: ICD-10-CM

## 2023-12-24 PROCEDURE — 70553 MRI BRAIN STEM W/O & W/DYE: CPT | Mod: 26

## 2023-12-24 PROCEDURE — A9585: CPT

## 2023-12-24 PROCEDURE — 70553 MRI BRAIN STEM W/O & W/DYE: CPT

## 2023-12-26 ENCOUNTER — NON-APPOINTMENT (OUTPATIENT)
Age: 63
End: 2023-12-26

## 2024-01-02 ENCOUNTER — NON-APPOINTMENT (OUTPATIENT)
Age: 64
End: 2024-01-02

## 2024-01-04 ENCOUNTER — APPOINTMENT (OUTPATIENT)
Dept: ULTRASOUND IMAGING | Facility: CLINIC | Age: 64
End: 2024-01-04

## 2024-01-08 ENCOUNTER — APPOINTMENT (OUTPATIENT)
Dept: CARDIOLOGY | Facility: CLINIC | Age: 64
End: 2024-01-08

## 2024-01-18 ENCOUNTER — APPOINTMENT (OUTPATIENT)
Dept: CARDIOLOGY | Facility: CLINIC | Age: 64
End: 2024-01-18
Payer: COMMERCIAL

## 2024-01-18 ENCOUNTER — NON-APPOINTMENT (OUTPATIENT)
Age: 64
End: 2024-01-18

## 2024-01-18 VITALS
OXYGEN SATURATION: 98 % | WEIGHT: 206 LBS | DIASTOLIC BLOOD PRESSURE: 70 MMHG | BODY MASS INDEX: 29.49 KG/M2 | HEART RATE: 78 BPM | HEIGHT: 70 IN | SYSTOLIC BLOOD PRESSURE: 128 MMHG

## 2024-01-18 DIAGNOSIS — I10 ESSENTIAL (PRIMARY) HYPERTENSION: ICD-10-CM

## 2024-01-18 DIAGNOSIS — R07.89 OTHER CHEST PAIN: ICD-10-CM

## 2024-01-18 DIAGNOSIS — R06.09 OTHER FORMS OF DYSPNEA: ICD-10-CM

## 2024-01-18 DIAGNOSIS — E78.5 HYPERLIPIDEMIA, UNSPECIFIED: ICD-10-CM

## 2024-01-18 PROCEDURE — 36415 COLL VENOUS BLD VENIPUNCTURE: CPT

## 2024-01-18 PROCEDURE — 99214 OFFICE O/P EST MOD 30 MIN: CPT

## 2024-01-18 PROCEDURE — 93000 ELECTROCARDIOGRAM COMPLETE: CPT

## 2024-01-18 RX ORDER — METOCLOPRAMIDE 10 MG/1
10 TABLET ORAL 3 TIMES DAILY
Qty: 6 | Refills: 0 | Status: DISCONTINUED | COMMUNITY
Start: 2023-08-08 | End: 2024-01-18

## 2024-01-18 RX ORDER — DULOXETINE HYDROCHLORIDE 30 MG/1
30 CAPSULE, DELAYED RELEASE ORAL TWICE DAILY
Refills: 0 | Status: ACTIVE | COMMUNITY

## 2024-01-18 NOTE — HISTORY OF PRESENT ILLNESS
[FreeTextEntry1] : Mr. Matta  presents in annual follow-up.  Sought multiple consultations about his skull base meningioma.  Now followed by Dr. Carreon at [facility] who has advised surgery.  However, recent repeat imaging demonstrated no change and he remains asymptomatic and not ready to proceed with surgery.  At the time of recent routine laboratory studies, he was found to be anemic and iron deficient.  EGD and colonoscopy were unrevealing.  Capsule study revealed evidence of some "ulcerations".  He was placed on iron therapy and enteroscopy at [facility] is scheduled next month. No chest, throat or jaw discomfort.  No palpitations, dizziness or syncope.  No edema or claudication. Rare, "1 second" sense of imbalance" no falls. He has not been exercising regularly and upon recent return to the gym felt uncomfortable at higher heart rates but without chest discomfort per se.  Fatigued and mildly breathless.  Recovers quickly.  Intolerant of Ozempic and discontinued.

## 2024-01-18 NOTE — RESULTS/DATA
[TextEntry] : ECG: Normal sinus rhythm at 76 bpm.  Left anterior hemiblock.  Otherwise remarkable and no change in comparison to prior tracings.

## 2024-01-19 LAB
ALBUMIN SERPL ELPH-MCNC: 4.3 G/DL
ALP BLD-CCNC: 60 U/L
ALT SERPL-CCNC: 20 U/L
ANION GAP SERPL CALC-SCNC: 12 MMOL/L
AST SERPL-CCNC: 14 U/L
BASOPHILS # BLD AUTO: 0.03 K/UL
BASOPHILS NFR BLD AUTO: 0.5 %
BILIRUB SERPL-MCNC: 0.6 MG/DL
BUN SERPL-MCNC: 14 MG/DL
CALCIUM SERPL-MCNC: 9.7 MG/DL
CHLORIDE SERPL-SCNC: 105 MMOL/L
CHOLEST SERPL-MCNC: 133 MG/DL
CO2 SERPL-SCNC: 25 MMOL/L
CREAT SERPL-MCNC: 0.87 MG/DL
EGFR: 96 ML/MIN/1.73M2
EOSINOPHIL # BLD AUTO: 0.43 K/UL
EOSINOPHIL NFR BLD AUTO: 6.8 %
ESTIMATED AVERAGE GLUCOSE: 137 MG/DL
GLUCOSE SERPL-MCNC: 128 MG/DL
HBA1C MFR BLD HPLC: 6.4 %
HCT VFR BLD CALC: 44.1 %
HDLC SERPL-MCNC: 40 MG/DL
HGB BLD-MCNC: 14.8 G/DL
IMM GRANULOCYTES NFR BLD AUTO: 0.3 %
IRON SATN MFR SERPL: 57 %
IRON SERPL-MCNC: 248 UG/DL
LDLC SERPL CALC-MCNC: 61 MG/DL
LDLC SERPL DIRECT ASSAY-MCNC: 73 MG/DL
LYMPHOCYTES # BLD AUTO: 1.53 K/UL
LYMPHOCYTES NFR BLD AUTO: 24.3 %
MAN DIFF?: NORMAL
MCHC RBC-ENTMCNC: 29.7 PG
MCHC RBC-ENTMCNC: 33.6 GM/DL
MCV RBC AUTO: 88.4 FL
MONOCYTES # BLD AUTO: 0.49 K/UL
MONOCYTES NFR BLD AUTO: 7.8 %
NEUTROPHILS # BLD AUTO: 3.8 K/UL
NEUTROPHILS NFR BLD AUTO: 60.3 %
NONHDLC SERPL-MCNC: 93 MG/DL
PLATELET # BLD AUTO: 265 K/UL
POTASSIUM SERPL-SCNC: 4.5 MMOL/L
PROT SERPL-MCNC: 7.1 G/DL
RBC # BLD: 4.99 M/UL
RBC # FLD: 14.2 %
SODIUM SERPL-SCNC: 142 MMOL/L
TIBC SERPL-MCNC: 437 UG/DL
TRIGL SERPL-MCNC: 194 MG/DL
UIBC SERPL-MCNC: 189 UG/DL
WBC # FLD AUTO: 6.3 K/UL

## 2024-01-22 LAB — APO LP(A) SERPL-MCNC: 15.1 NMOL/L

## 2024-01-26 ENCOUNTER — APPOINTMENT (OUTPATIENT)
Dept: ULTRASOUND IMAGING | Facility: CLINIC | Age: 64
End: 2024-01-26
Payer: COMMERCIAL

## 2024-01-26 ENCOUNTER — OUTPATIENT (OUTPATIENT)
Dept: OUTPATIENT SERVICES | Facility: HOSPITAL | Age: 64
LOS: 1 days | End: 2024-01-26
Payer: COMMERCIAL

## 2024-01-26 DIAGNOSIS — Z90.79 ACQUIRED ABSENCE OF OTHER GENITAL ORGAN(S): Chronic | ICD-10-CM

## 2024-01-26 DIAGNOSIS — K42.9 UMBILICAL HERNIA WITHOUT OBSTRUCTION OR GANGRENE: Chronic | ICD-10-CM

## 2024-01-26 DIAGNOSIS — Z00.8 ENCOUNTER FOR OTHER GENERAL EXAMINATION: ICD-10-CM

## 2024-01-26 PROCEDURE — 76775 US EXAM ABDO BACK WALL LIM: CPT

## 2024-01-26 PROCEDURE — 76775 US EXAM ABDO BACK WALL LIM: CPT | Mod: 26

## 2024-01-30 NOTE — HISTORY OF PRESENT ILLNESS
Goal Outcome Evaluation:  Plan of Care Reviewed With: patient                        Pt dc to SNF. Family to take.                    [FreeTextEntry1] : Sigrid returns for evaluation of iron deficiency anemia.  Last Hgb 12.3, Fe 28/TIBC 452/Ferritin 8.  EGD 2/13/2023 revealed Candidal esophagitis, for which he was given a one-week course of fluconazole.  Repeat colonoscopy 2/13/2023 revealed two hyperplastic rectal polyp and hemorrhoids, although prep was suboptimal (for which repeat colonoscopy was advised within three years hence).  Stools have been somewhat darker from time to time, without matti rectal bleeding.  He has been taking diclofenac for the past six days, was not on NSAIDs or ASA prior to that.  Since last visit, he started Ozempic.  He recalls having undergone capsule endoscopy about 15 years ago, at which time "small area of irritation" was found.  He voices concerns regarding potential complications of the capsule, as he has history of small bowel obstruction, likely from adhesions.

## 2024-02-14 ENCOUNTER — NON-APPOINTMENT (OUTPATIENT)
Age: 64
End: 2024-02-14

## 2024-02-15 ENCOUNTER — NON-APPOINTMENT (OUTPATIENT)
Age: 64
End: 2024-02-15

## 2024-02-16 ENCOUNTER — APPOINTMENT (OUTPATIENT)
Dept: CARDIOLOGY | Facility: CLINIC | Age: 64
End: 2024-02-16

## 2024-04-02 ENCOUNTER — NON-APPOINTMENT (OUTPATIENT)
Age: 64
End: 2024-04-02

## 2024-04-02 ENCOUNTER — APPOINTMENT (OUTPATIENT)
Dept: GASTROENTEROLOGY | Facility: CLINIC | Age: 64
End: 2024-04-02

## 2024-04-26 RX ORDER — SEMAGLUTIDE 1.34 MG/ML
4 INJECTION, SOLUTION SUBCUTANEOUS
Qty: 3 | Refills: 3 | Status: DISCONTINUED | COMMUNITY
Start: 2023-04-13 | End: 2024-01-18

## 2024-05-23 ENCOUNTER — APPOINTMENT (OUTPATIENT)
Dept: CARDIOLOGY | Facility: CLINIC | Age: 64
End: 2024-05-23
Payer: COMMERCIAL

## 2024-05-23 PROCEDURE — 93351 STRESS TTE COMPLETE: CPT

## 2024-08-15 ENCOUNTER — APPOINTMENT (OUTPATIENT)
Dept: ENDOCRINOLOGY | Facility: CLINIC | Age: 64
End: 2024-08-15

## 2024-09-18 ENCOUNTER — APPOINTMENT (OUTPATIENT)
Dept: ULTRASOUND IMAGING | Facility: CLINIC | Age: 64
End: 2024-09-18
Payer: COMMERCIAL

## 2024-09-18 ENCOUNTER — OUTPATIENT (OUTPATIENT)
Dept: OUTPATIENT SERVICES | Facility: HOSPITAL | Age: 64
LOS: 1 days | End: 2024-09-18
Payer: COMMERCIAL

## 2024-09-18 DIAGNOSIS — Z90.79 ACQUIRED ABSENCE OF OTHER GENITAL ORGAN(S): Chronic | ICD-10-CM

## 2024-09-18 DIAGNOSIS — K42.9 UMBILICAL HERNIA WITHOUT OBSTRUCTION OR GANGRENE: Chronic | ICD-10-CM

## 2024-09-18 DIAGNOSIS — Z00.8 ENCOUNTER FOR OTHER GENERAL EXAMINATION: ICD-10-CM

## 2024-09-18 PROCEDURE — 76770 US EXAM ABDO BACK WALL COMP: CPT

## 2024-09-18 PROCEDURE — 76770 US EXAM ABDO BACK WALL COMP: CPT | Mod: 26

## 2024-09-30 ENCOUNTER — APPOINTMENT (OUTPATIENT)
Dept: SURGERY | Facility: CLINIC | Age: 64
End: 2024-09-30
Payer: COMMERCIAL

## 2024-09-30 VITALS
DIASTOLIC BLOOD PRESSURE: 88 MMHG | HEIGHT: 70 IN | WEIGHT: 206 LBS | BODY MASS INDEX: 29.49 KG/M2 | SYSTOLIC BLOOD PRESSURE: 141 MMHG | HEART RATE: 83 BPM | OXYGEN SATURATION: 98 %

## 2024-09-30 DIAGNOSIS — F32.A ANXIETY DISORDER, UNSPECIFIED: ICD-10-CM

## 2024-09-30 DIAGNOSIS — R10.32 LEFT LOWER QUADRANT PAIN: ICD-10-CM

## 2024-09-30 DIAGNOSIS — F41.9 ANXIETY DISORDER, UNSPECIFIED: ICD-10-CM

## 2024-09-30 PROCEDURE — 99202 OFFICE O/P NEW SF 15 MIN: CPT

## 2024-09-30 NOTE — PHYSICAL EXAM
[Normal Breath Sounds] : Normal breath sounds [Normal Heart Sounds] : normal heart sounds [Normal Rate and Rhythm] : normal rate and rhythm [Alert] : alert [Oriented to Person] : oriented to person [Oriented to Place] : oriented to place [Oriented to Time] : oriented to time [Calm] : calm [Abdominal Masses] : No abdominal masses [de-identified] : well-developed white male in NAD [de-identified] : Normal BS, soft, non-tender, umbilical hernia repair intact [de-identified] : No CVAT, No le swelling

## 2024-09-30 NOTE — HISTORY OF PRESENT ILLNESS
[de-identified] : mild pain left lower abdomen [de-identified] : This 65 yo M with several week h/o of left lower abdominal pain.  The patient states pain comes and goes and denies feeling a lump.  The patient denies fever, chills, nausea, vomiting and change in bowel and bladder habits.  Colonoscopy 02/13/2023: Internal Hemorrhoids, sessile polyps in rectum, benign. S. Bowel Enteroscopy 02/02/2024  Patient is followed by Dr. S Goldberg (GI) @ [facility] -h/o Fe deficiency Anemia -h/o GERD and gastric erosion and jejunal ulcer, as per Capsule Endoscopy 09/06/2023 -h/o colonic polyps, biopsy benign per patient -h/o SBO, treated conservatively [id number]  Prior Ab/Pelvic Surgeries -Repair of Umbilical Hernia with Mesh -TURP

## 2024-09-30 NOTE — REVIEW OF SYSTEMS
[Abdominal Pain] : abdominal pain [Heartburn] : heartburn [Anxiety] : anxiety [Depression] : depression [As Noted in HPI] : as noted in HPI [Negative] : Endocrine [de-identified] : on Cymbalta [de-identified] : Fe deficiency anemia

## 2024-11-02 ENCOUNTER — INPATIENT (INPATIENT)
Facility: HOSPITAL | Age: 64
LOS: 1 days | Discharge: HOME | DRG: 390 | End: 2024-11-04
Attending: STUDENT IN AN ORGANIZED HEALTH CARE EDUCATION/TRAINING PROGRAM | Admitting: STUDENT IN AN ORGANIZED HEALTH CARE EDUCATION/TRAINING PROGRAM
Payer: COMMERCIAL

## 2024-11-02 VITALS
HEIGHT: 70 IN | WEIGHT: 205.91 LBS | HEART RATE: 96 BPM | RESPIRATION RATE: 18 BRPM | SYSTOLIC BLOOD PRESSURE: 169 MMHG | OXYGEN SATURATION: 99 % | DIASTOLIC BLOOD PRESSURE: 92 MMHG | TEMPERATURE: 98 F

## 2024-11-02 DIAGNOSIS — K56.609 UNSPECIFIED INTESTINAL OBSTRUCTION, UNSPECIFIED AS TO PARTIAL VERSUS COMPLETE OBSTRUCTION: ICD-10-CM

## 2024-11-02 DIAGNOSIS — K42.9 UMBILICAL HERNIA WITHOUT OBSTRUCTION OR GANGRENE: Chronic | ICD-10-CM

## 2024-11-02 DIAGNOSIS — Z90.79 ACQUIRED ABSENCE OF OTHER GENITAL ORGAN(S): Chronic | ICD-10-CM

## 2024-11-02 LAB
ADD ON TEST-SPECIMEN IN LAB: SIGNIFICANT CHANGE UP
ALBUMIN SERPL ELPH-MCNC: 4.9 G/DL — SIGNIFICANT CHANGE UP (ref 3.3–5)
ALP SERPL-CCNC: 93 U/L — SIGNIFICANT CHANGE UP (ref 40–120)
ALT FLD-CCNC: 24 U/L — SIGNIFICANT CHANGE UP (ref 10–45)
ANION GAP SERPL CALC-SCNC: 15 MMOL/L — SIGNIFICANT CHANGE UP (ref 5–17)
APTT BLD: 26 SEC — SIGNIFICANT CHANGE UP (ref 24.5–35.6)
AST SERPL-CCNC: 20 U/L — SIGNIFICANT CHANGE UP (ref 10–40)
BASOPHILS # BLD AUTO: 0.06 K/UL — SIGNIFICANT CHANGE UP (ref 0–0.2)
BASOPHILS NFR BLD AUTO: 0.5 % — SIGNIFICANT CHANGE UP (ref 0–2)
BILIRUB SERPL-MCNC: 0.9 MG/DL — SIGNIFICANT CHANGE UP (ref 0.2–1.2)
BLD GP AB SCN SERPL QL: NEGATIVE — SIGNIFICANT CHANGE UP
BUN SERPL-MCNC: 16 MG/DL — SIGNIFICANT CHANGE UP (ref 7–23)
CALCIUM SERPL-MCNC: 10.7 MG/DL — HIGH (ref 8.4–10.5)
CHLORIDE SERPL-SCNC: 101 MMOL/L — SIGNIFICANT CHANGE UP (ref 96–108)
CO2 SERPL-SCNC: 25 MMOL/L — SIGNIFICANT CHANGE UP (ref 22–31)
CREAT SERPL-MCNC: 0.89 MG/DL — SIGNIFICANT CHANGE UP (ref 0.5–1.3)
EGFR: 95 ML/MIN/1.73M2 — SIGNIFICANT CHANGE UP
EOSINOPHIL # BLD AUTO: 0.03 K/UL — SIGNIFICANT CHANGE UP (ref 0–0.5)
EOSINOPHIL NFR BLD AUTO: 0.2 % — SIGNIFICANT CHANGE UP (ref 0–6)
GLUCOSE SERPL-MCNC: 187 MG/DL — HIGH (ref 70–99)
HCT VFR BLD CALC: 51.2 % — HIGH (ref 39–50)
HGB BLD-MCNC: 17 G/DL — SIGNIFICANT CHANGE UP (ref 13–17)
IMM GRANULOCYTES NFR BLD AUTO: 0.5 % — SIGNIFICANT CHANGE UP (ref 0–0.9)
INR BLD: 1.04 RATIO — SIGNIFICANT CHANGE UP (ref 0.85–1.16)
LYMPHOCYTES # BLD AUTO: 0.84 K/UL — LOW (ref 1–3.3)
LYMPHOCYTES # BLD AUTO: 6.6 % — LOW (ref 13–44)
MCHC RBC-ENTMCNC: 29.8 PG — SIGNIFICANT CHANGE UP (ref 27–34)
MCHC RBC-ENTMCNC: 33.2 G/DL — SIGNIFICANT CHANGE UP (ref 32–36)
MCV RBC AUTO: 89.7 FL — SIGNIFICANT CHANGE UP (ref 80–100)
MONOCYTES # BLD AUTO: 0.58 K/UL — SIGNIFICANT CHANGE UP (ref 0–0.9)
MONOCYTES NFR BLD AUTO: 4.5 % — SIGNIFICANT CHANGE UP (ref 2–14)
NEUTROPHILS # BLD AUTO: 11.18 K/UL — HIGH (ref 1.8–7.4)
NEUTROPHILS NFR BLD AUTO: 87.7 % — HIGH (ref 43–77)
NRBC # BLD: 0 /100 WBCS — SIGNIFICANT CHANGE UP (ref 0–0)
PLATELET # BLD AUTO: 294 K/UL — SIGNIFICANT CHANGE UP (ref 150–400)
POTASSIUM SERPL-MCNC: 4.6 MMOL/L — SIGNIFICANT CHANGE UP (ref 3.5–5.3)
POTASSIUM SERPL-SCNC: 4.6 MMOL/L — SIGNIFICANT CHANGE UP (ref 3.5–5.3)
PROT SERPL-MCNC: 8.5 G/DL — HIGH (ref 6–8.3)
PROTHROM AB SERPL-ACNC: 11.8 SEC — SIGNIFICANT CHANGE UP (ref 9.9–13.4)
RBC # BLD: 5.71 M/UL — SIGNIFICANT CHANGE UP (ref 4.2–5.8)
RBC # FLD: 13.9 % — SIGNIFICANT CHANGE UP (ref 10.3–14.5)
RH IG SCN BLD-IMP: POSITIVE — SIGNIFICANT CHANGE UP
SODIUM SERPL-SCNC: 141 MMOL/L — SIGNIFICANT CHANGE UP (ref 135–145)
WBC # BLD: 12.76 K/UL — HIGH (ref 3.8–10.5)
WBC # FLD AUTO: 12.76 K/UL — HIGH (ref 3.8–10.5)

## 2024-11-02 PROCEDURE — 99285 EMERGENCY DEPT VISIT HI MDM: CPT

## 2024-11-02 PROCEDURE — 74177 CT ABD & PELVIS W/CONTRAST: CPT | Mod: 26,MC

## 2024-11-02 RX ORDER — ACETAMINOPHEN 500 MG
1000 TABLET ORAL ONCE
Refills: 0 | Status: COMPLETED | OUTPATIENT
Start: 2024-11-02 | End: 2024-11-02

## 2024-11-02 RX ORDER — SODIUM CHLORIDE 9 MG/ML
1000 INJECTION, SOLUTION INTRAMUSCULAR; INTRAVENOUS; SUBCUTANEOUS ONCE
Refills: 0 | Status: COMPLETED | OUTPATIENT
Start: 2024-11-02 | End: 2024-11-02

## 2024-11-02 RX ORDER — SODIUM CHLORIDE 9 MG/ML
500 INJECTION, SOLUTION INTRAMUSCULAR; INTRAVENOUS; SUBCUTANEOUS ONCE
Refills: 0 | Status: COMPLETED | OUTPATIENT
Start: 2024-11-02 | End: 2024-11-02

## 2024-11-02 RX ORDER — ONDANSETRON HYDROCHLORIDE 2 MG/ML
4 INJECTION, SOLUTION INTRAMUSCULAR; INTRAVENOUS ONCE
Refills: 0 | Status: COMPLETED | OUTPATIENT
Start: 2024-11-02 | End: 2024-11-02

## 2024-11-02 RX ADMIN — Medication 400 MILLIGRAM(S): at 14:19

## 2024-11-02 RX ADMIN — ONDANSETRON HYDROCHLORIDE 4 MILLIGRAM(S): 2 INJECTION, SOLUTION INTRAMUSCULAR; INTRAVENOUS at 16:15

## 2024-11-02 RX ADMIN — SODIUM CHLORIDE 500 MILLILITER(S): 9 INJECTION, SOLUTION INTRAMUSCULAR; INTRAVENOUS; SUBCUTANEOUS at 14:19

## 2024-11-02 NOTE — ED PROVIDER NOTE - CLINICAL SUMMARY MEDICAL DECISION MAKING FREE TEXT BOX
The patient currently reports abdominal pain. Based upon the history and exam, the patient requires CT imaging of the abdomen and pelvis. They have abdominal tenderness or pain concerning for an acute intra-abdominal pathology including infection or obstruction. Will treat pain and nausea.

## 2024-11-02 NOTE — ED PROVIDER NOTE - PHYSICAL EXAMINATION
General: alert, oriented to person, time, place  Psych: mood appropriate  Head: normocephalic; atraumatic  Eyes: conjunctivae clear bilaterally, sclerae anicteric  ENT: no nasal flaring, patent nares  Cardio: RRR, no m/r/g, pulses 2+ b/l  Resp: CATB, no w/r/r  GI: diffuse abdominal tenderness to palpation  Neuro: normal sensation, moving all four extremities equally  Skin: No evidence of rash or bruising  MSK: normal movement of all extremities  Lymph/Vasc: no LE edema

## 2024-11-02 NOTE — ED PROVIDER NOTE - PROGRESS NOTE DETAILS
pt done w/ contrast, spoke w/ CT aware they will come scan pt prior umbilical hernia, and preior prostate sx, +SBO, pt updated, sx consulted, pt not vomiting Se Rubi MD PGY2: Patient reassessed, stable. No vomiting, had BM. Surg deferring admission to their service, offered surgery but patient wants medical management and will follow up with [facility] upon DC. Admit to medicine, surgery attempted NG tube but deferring after failed attempts. Lab and imaging results were discussed with the patient. Shared decision making was held between provider and patient with regards to disposition decision. All questions and concerns were addressed. Patient is agreeable to disposition plan. Called to the bedside after patient had an NG tube attempted to be placed by general surgery patient reported with his son at bedside that he had a transsphenoidal approach for meningioma resection done in June.  Offered to call neurosurgery for the patient patient declined he states that he prefers to wait for monitoring and if he were to develop worsening nosebleed or drainage from the nose he will alert the staff and would be okay with us discussing with neurosurgery.  Patient reports that the surgery was done at [facility] in June he reports that initially it was done through orbital approach but then the following day he developed a CSF leak which was then treated surgically through a transsphenoidal approach.  They state that the surgery resident knew of this began offered to call neurosurgery for further recommendations he currently has no nosebleed.  He has a small area on the anterior aspect of the nasal septum on the right that has a small area of irritation he has no active bleeding in his oropharynx. assessed pt b/l breath sounds, sating  percent on RA

## 2024-11-02 NOTE — CONSULT NOTE ADULT - SUBJECTIVE AND OBJECTIVE BOX
Surgery Consult Note    HPI:  64M HLD, HTN, previous umbilical hernia, anemia, during work up of anemia patient was found to have 2 large small bowel polyps and follows at [facility] [place] for possible removal via push enteroscopy. Patient endorses long history of SBO that have resolved with medical management Patient is now presenting with bloating and feelings of discomfort since Friday. Patient reports no flatus in 2 days. Reports small bms through out the day. 1 episode of large volume vomiting this Am. Nausea.     Patient was seen and examined by surgical oncology. Patient given option to go to [facility] via ER, be admitted to medicine for management of SBO, or surgery by the surgical team. Patient opted for medical management and once stable transfer to [facility]. AFVSS. WBC 12. CT scan showing dilated and distended fluid filled stomach and small bowel transition point in mid abdomen in an around of small bowel irregularity concerning for polyps seen on endoscopy.     PAST MEDICAL & SURGICAL HISTORY:  HTN (hypertension)      HLD (hyperlipidemia)      Depression      Pre-diabetes      Umbilical hernia      History of robot-assisted laparoscopic radical prostatectomy        Allergies    No Known Allergies    Intolerances      Home Medications:  buPROPion 150 mg/24 hours (XL) oral tablet, extended release: 1 tab(s) orally every 24 hours (21 Jan 2020 10:42)  Crestor 10 mg oral tablet: 1 tab(s) orally once a day (21 Jan 2020 10:42)  metFORMIN 500 mg oral tablet: 1 tab(s) orally 2 times a day (21 Jan 2020 10:42)  Viibryd 20 mg oral tablet: 1 tab(s) orally once a day (21 Jan 2020 10:42)    MEDICATIONS  (STANDING):  sodium chloride 0.9% Bolus 1000 milliLiter(s) IV Bolus once      SOCIAL HISTORY:  FAMILY HISTORY:      ___________________________________________  OBJECTIVE:  Vital Signs Last 24 Hrs  T(C): 36.3 (02 Nov 2024 13:42), Max: 36.4 (02 Nov 2024 12:13)  T(F): 97.4 (02 Nov 2024 13:42), Max: 97.6 (02 Nov 2024 12:13)  HR: 88 (02 Nov 2024 13:42) (88 - 96)  BP: 153/93 (02 Nov 2024 13:42) (153/93 - 169/92)  BP(mean): --  RR: 16 (02 Nov 2024 13:42) (16 - 18)  SpO2: 100% (02 Nov 2024 13:42) (99% - 100%)    Parameters below as of 02 Nov 2024 13:42  Patient On (Oxygen Delivery Method): room air    CAPILLARY BLOOD GLUCOSE        I&O's Detail    General: Well developed, well nourished, NAD  Neuro: Alert and oriented, no focal deficits, moves all extremities spontaneously  HEENT: NCAT, EOMI, anicteric, mucosa moist  Respiratory: Airway patent, respirations unlabored  CVS: Regular rate and rhythm  Abdomen: Soft, nontender, softly distended  Extremities: No edema, sensation and movement grossly intact  Skin: Warm, dry, appropriate color  ____________________________________________  LABS:  CBC Full  -  ( 02 Nov 2024 14:20 )  WBC Count : 12.76 K/uL  RBC Count : 5.71 M/uL  Hemoglobin : 17.0 g/dL  Hematocrit : 51.2 %  Platelet Count - Automated : 294 K/uL  Mean Cell Volume : 89.7 fl  Mean Cell Hemoglobin : 29.8 pg  Mean Cell Hemoglobin Concentration : 33.2 g/dL  Auto Neutrophil # : 11.18 K/uL  Auto Lymphocyte # : 0.84 K/uL  Auto Monocyte # : 0.58 K/uL  Auto Eosinophil # : 0.03 K/uL  Auto Basophil # : 0.06 K/uL  Auto Neutrophil % : 87.7 %  Auto Lymphocyte % : 6.6 %  Auto Monocyte % : 4.5 %  Auto Eosinophil % : 0.2 %  Auto Basophil % : 0.5 %    11-02    141  |  101  |  16  ----------------------------<  187[H]  4.6   |  25  |  0.89    Ca    10.7[H]      02 Nov 2024 14:20    TPro  8.5[H]  /  Alb  4.9  /  TBili  0.9  /  DBili  x   /  AST  20  /  ALT  24  /  AlkPhos  93  11-02    LIVER FUNCTIONS - ( 02 Nov 2024 14:20 )  Alb: 4.9 g/dL / Pro: 8.5 g/dL / ALK PHOS: 93 U/L / ALT: 24 U/L / AST: 20 U/L / GGT: x           PT/INR - ( 02 Nov 2024 14:20 )   PT: 11.8 sec;   INR: 1.04 ratio         PTT - ( 02 Nov 2024 14:20 )  PTT:26.0 sec  Urinalysis Basic - ( 02 Nov 2024 14:20 )    Color: x / Appearance: x / SG: x / pH: x  Gluc: 187 mg/dL / Ketone: x  / Bili: x / Urobili: x   Blood: x / Protein: x / Nitrite: x   Leuk Esterase: x / RBC: x / WBC x   Sq Epi: x / Non Sq Epi: x / Bacteria: x    ____________________________________________  RADIOLOGY:  < from: CT Abdomen and Pelvis w/ Oral Cont and w/ IV Cont (11.02.24 @ 16:57) >  IMPRESSION:  Significantly distended and fluid-filled stomach and most of the proximal   and mid abdominal small bowel loops with air-fluid levels are visible   suggesting small bowel obstruction with likely a transition present in   the mid abdomen, best seen on the coronal image 6-31 and the axial image   4-14; there is some irregularity of the small bowel wall is visible at   this level; a follow-up oral contrast is recommended to exclude a   stenosing lesion in the transition area.    No evidence for abnormal peritoneum or pneumatosis.    --- End of Report ---      < end of copied text >

## 2024-11-02 NOTE — ED PROVIDER NOTE - OBJECTIVE STATEMENT
64-year-old male with past medical history of hyperlipidemia, hypertension, previous umbilical hernia repaired, presents emergency department with several days of abdominal pain diffusely associated with nausea and vomiting.  He states that he has had a bowel obstruction before.  His last bowel movement was earlier today but is not passing gas.

## 2024-11-02 NOTE — ED ADULT NURSE NOTE - OBJECTIVE STATEMENT
63 y/o male with PMH of Depression, HTN, HLD, prediabetes arrives to the ER complaining of abdominal pain .Pt reports having intermittent epigastric abdominal pain that comes in sharp waves since yesterday.  Pt reports that the pain comes every 3/4 hours and is so intense that he brakes in cold sweats. . Pt reports forcing himself to vomit today to feel relief. from the discomfort.. Pt reports las bowel movement was this AM, last meal last night.  Pt denies SOB, chest pain, dizziness, urinary symptoms, fevers, chills.  On assessment pt is well appearing, A&Ox4, speaking coherently, airway is patent, breathing spontaneously and unlabored. Skin is dry, warm. Abdomen is soft, no distended, no tender. Full ROM in all extremities.  Patient undressed and placed into gown, side rails up with bed locked and in lowest position for safety. call bell within reach. [place] provided. Comfort and safety provided. will continue to reassess. 63 y/o male with PMH of Depression, HTN, HLD, prediabetes arrives to the ER complaining of abdominal pain .Pt reports having intermittent epigastric abdominal pain that comes in sharp waves since yesterday.  Pt reports that the pain comes every 3/4 hours and is so intense that he brakes in cold sweats. . Pt reports forcing himself to vomit today to feel relief. from the discomfort.. Reports having a bowel obstruction before. Pt reports las bowel movement was this AM, not passing gas since last night. last meal last night.  Pt denies SOB, chest pain, dizziness, urinary symptoms, fevers, chills.  On assessment pt is well appearing, A&Ox4, speaking coherently, airway is patent, breathing spontaneously and unlabored. Skin is dry, warm. Abdomen is soft, distended, tender. Full ROM in all extremities.  Patient undressed and placed into gown, side rails up with bed locked and in lowest position for safety. call bell within reach. [place] provided. Comfort and safety provided. will continue to reassess.

## 2024-11-02 NOTE — CONSULT NOTE ADULT - ASSESSMENT
64M HLD, HTN, previous umbilical hernia, anemia, during work up of anemia patient was found to have 2 large small bowel polyps and [place] [facility] [place]. Patient endorses long history of SBO that have resolved with medical management, patient is now presenting with bloating and feelings of discomfort since Friday, nausea, vomiting, no flatus, passing small bms     P:  - NPO  - IVF  - Patient will need NGT if vomits again  - Malignant SBO protocol    - Reglan 110mg IV push q8h    - Octreotide 100mg sc q8h    - Decadron 4mg IV push q12h    Discussed with Dr. Allen

## 2024-11-02 NOTE — ED PROVIDER NOTE - ATTENDING CONTRIBUTION TO CARE
Hx: pt with intermittent nausea and vomiting, vomited this morning with partial relief of abdominal pain.  Prior h/o SBO. No fever, cp, sob.    PE: well appearing, nontoxic, no respiratory distress.  Neuro nonfocal.  Skin intact. Psych normal mood.  ab: +mild td epigastric, slight distension, no guarding or ebound    MDM: nausea, vomiting, ab pain, concern for SBO vs ileus, also consider pancreatitis, check cbc r/o anemia or leukocytosis, check bmp to r/o metabolic derangement and lyte imbalance, lipase, ct abdomen/pelvis with oral and iv contrast, antiemetics, iv fluids.    Progress Note 1515: s/o to evening attending pending ct results.

## 2024-11-03 DIAGNOSIS — E78.5 HYPERLIPIDEMIA, UNSPECIFIED: ICD-10-CM

## 2024-11-03 DIAGNOSIS — Z29.9 ENCOUNTER FOR PROPHYLACTIC MEASURES, UNSPECIFIED: ICD-10-CM

## 2024-11-03 DIAGNOSIS — F41.8 OTHER SPECIFIED ANXIETY DISORDERS: ICD-10-CM

## 2024-11-03 DIAGNOSIS — F32.9 MAJOR DEPRESSIVE DISORDER, SINGLE EPISODE, UNSPECIFIED: ICD-10-CM

## 2024-11-03 DIAGNOSIS — D50.9 IRON DEFICIENCY ANEMIA, UNSPECIFIED: ICD-10-CM

## 2024-11-03 DIAGNOSIS — K56.609 UNSPECIFIED INTESTINAL OBSTRUCTION, UNSPECIFIED AS TO PARTIAL VERSUS COMPLETE OBSTRUCTION: ICD-10-CM

## 2024-11-03 DIAGNOSIS — E11.9 TYPE 2 DIABETES MELLITUS WITHOUT COMPLICATIONS: ICD-10-CM

## 2024-11-03 DIAGNOSIS — R73.03 PREDIABETES: ICD-10-CM

## 2024-11-03 LAB
A1C WITH ESTIMATED AVERAGE GLUCOSE RESULT: 6.4 % — HIGH (ref 4–5.6)
ALBUMIN SERPL ELPH-MCNC: 3.3 G/DL — SIGNIFICANT CHANGE UP (ref 3.3–5)
ALP SERPL-CCNC: 63 U/L — SIGNIFICANT CHANGE UP (ref 40–120)
ALT FLD-CCNC: 17 U/L — SIGNIFICANT CHANGE UP (ref 10–45)
ANION GAP SERPL CALC-SCNC: 11 MMOL/L — SIGNIFICANT CHANGE UP (ref 5–17)
AST SERPL-CCNC: 12 U/L — SIGNIFICANT CHANGE UP (ref 10–40)
BILIRUB SERPL-MCNC: 1.1 MG/DL — SIGNIFICANT CHANGE UP (ref 0.2–1.2)
BUN SERPL-MCNC: 18 MG/DL — SIGNIFICANT CHANGE UP (ref 7–23)
CALCIUM SERPL-MCNC: 8.5 MG/DL — SIGNIFICANT CHANGE UP (ref 8.4–10.5)
CHLORIDE SERPL-SCNC: 106 MMOL/L — SIGNIFICANT CHANGE UP (ref 96–108)
CO2 SERPL-SCNC: 23 MMOL/L — SIGNIFICANT CHANGE UP (ref 22–31)
CREAT SERPL-MCNC: 0.8 MG/DL — SIGNIFICANT CHANGE UP (ref 0.5–1.3)
EGFR: 98 ML/MIN/1.73M2 — SIGNIFICANT CHANGE UP
ESTIMATED AVERAGE GLUCOSE: 137 MG/DL — HIGH (ref 68–114)
GLUCOSE BLDC GLUCOMTR-MCNC: 113 MG/DL — HIGH (ref 70–99)
GLUCOSE BLDC GLUCOMTR-MCNC: 145 MG/DL — HIGH (ref 70–99)
GLUCOSE BLDC GLUCOMTR-MCNC: 147 MG/DL — HIGH (ref 70–99)
GLUCOSE BLDC GLUCOMTR-MCNC: 159 MG/DL — HIGH (ref 70–99)
GLUCOSE SERPL-MCNC: 121 MG/DL — HIGH (ref 70–99)
HCT VFR BLD CALC: 41.3 % — SIGNIFICANT CHANGE UP (ref 39–50)
HGB BLD-MCNC: 13.2 G/DL — SIGNIFICANT CHANGE UP (ref 13–17)
MCHC RBC-ENTMCNC: 28.4 PG — SIGNIFICANT CHANGE UP (ref 27–34)
MCHC RBC-ENTMCNC: 32 G/DL — SIGNIFICANT CHANGE UP (ref 32–36)
MCV RBC AUTO: 88.8 FL — SIGNIFICANT CHANGE UP (ref 80–100)
NRBC # BLD: 0 /100 WBCS — SIGNIFICANT CHANGE UP (ref 0–0)
PLATELET # BLD AUTO: 222 K/UL — SIGNIFICANT CHANGE UP (ref 150–400)
POTASSIUM SERPL-MCNC: 3.7 MMOL/L — SIGNIFICANT CHANGE UP (ref 3.5–5.3)
POTASSIUM SERPL-SCNC: 3.7 MMOL/L — SIGNIFICANT CHANGE UP (ref 3.5–5.3)
PROT SERPL-MCNC: 6 G/DL — SIGNIFICANT CHANGE UP (ref 6–8.3)
RBC # BLD: 4.65 M/UL — SIGNIFICANT CHANGE UP (ref 4.2–5.8)
RBC # FLD: 14 % — SIGNIFICANT CHANGE UP (ref 10.3–14.5)
SODIUM SERPL-SCNC: 140 MMOL/L — SIGNIFICANT CHANGE UP (ref 135–145)
WBC # BLD: 5.9 K/UL — SIGNIFICANT CHANGE UP (ref 3.8–10.5)
WBC # FLD AUTO: 5.9 K/UL — SIGNIFICANT CHANGE UP (ref 3.8–10.5)

## 2024-11-03 PROCEDURE — 99232 SBSQ HOSP IP/OBS MODERATE 35: CPT

## 2024-11-03 PROCEDURE — 93010 ELECTROCARDIOGRAM REPORT: CPT

## 2024-11-03 RX ORDER — INFLUENZ VIR VAC TV P-SURF2003 15MCG/.5ML
0.5 SYRINGE (ML) INTRAMUSCULAR ONCE
Refills: 0 | Status: DISCONTINUED | OUTPATIENT
Start: 2024-11-03 | End: 2024-11-04

## 2024-11-03 RX ORDER — INSULIN LISPRO 100/ML
VIAL (ML) SUBCUTANEOUS
Refills: 0 | Status: DISCONTINUED | OUTPATIENT
Start: 2024-11-03 | End: 2024-11-04

## 2024-11-03 RX ORDER — GLUCAGON INJECTION, SOLUTION 1 MG/.2ML
1 INJECTION, SOLUTION SUBCUTANEOUS ONCE
Refills: 0 | Status: DISCONTINUED | OUTPATIENT
Start: 2024-11-03 | End: 2024-11-04

## 2024-11-03 RX ORDER — ENOXAPARIN SODIUM 40MG/0.4ML
40 SYRINGE (ML) SUBCUTANEOUS EVERY 24 HOURS
Refills: 0 | Status: DISCONTINUED | OUTPATIENT
Start: 2024-11-03 | End: 2024-11-04

## 2024-11-03 RX ORDER — METFORMIN HYDROCHLORIDE 500 MG/1
1 TABLET, EXTENDED RELEASE ORAL
Refills: 0 | DISCHARGE

## 2024-11-03 RX ORDER — METOCLOPRAMIDE HCL 10 MG
10 TABLET ORAL EVERY 8 HOURS
Refills: 0 | Status: DISCONTINUED | OUTPATIENT
Start: 2024-11-03 | End: 2024-11-04

## 2024-11-03 RX ORDER — DEXAMETHASONE 1.5 MG 1.5 MG/1
4 TABLET ORAL EVERY 12 HOURS
Refills: 0 | Status: DISCONTINUED | OUTPATIENT
Start: 2024-11-03 | End: 2024-11-04

## 2024-11-03 RX ORDER — OCTREOTIDE ACETATE 1000 UG/ML
100 INJECTION INTRAVENOUS; SUBCUTANEOUS EVERY 8 HOURS
Refills: 0 | Status: DISCONTINUED | OUTPATIENT
Start: 2024-11-03 | End: 2024-11-04

## 2024-11-03 RX ORDER — DULOXETINE HYDROCHLORIDE 30 MG/1
1 CAPSULE, DELAYED RELEASE ORAL
Refills: 0 | DISCHARGE

## 2024-11-03 RX ADMIN — OCTREOTIDE ACETATE 100 MICROGRAM(S): 1000 INJECTION INTRAVENOUS; SUBCUTANEOUS at 05:33

## 2024-11-03 RX ADMIN — Medication 1: at 08:53

## 2024-11-03 RX ADMIN — Medication 40 MILLIGRAM(S): at 05:41

## 2024-11-03 RX ADMIN — Medication 10 MILLIGRAM(S): at 22:29

## 2024-11-03 RX ADMIN — Medication 100 MILLILITER(S): at 01:31

## 2024-11-03 RX ADMIN — SODIUM CHLORIDE 1000 MILLILITER(S): 9 INJECTION, SOLUTION INTRAMUSCULAR; INTRAVENOUS; SUBCUTANEOUS at 00:11

## 2024-11-03 RX ADMIN — Medication 10 MILLIGRAM(S): at 13:47

## 2024-11-03 RX ADMIN — OCTREOTIDE ACETATE 100 MICROGRAM(S): 1000 INJECTION INTRAVENOUS; SUBCUTANEOUS at 22:29

## 2024-11-03 RX ADMIN — Medication 100 MILLILITER(S): at 08:53

## 2024-11-03 RX ADMIN — DEXAMETHASONE 1.5 MG 4 MILLIGRAM(S): 1.5 TABLET ORAL at 05:36

## 2024-11-03 RX ADMIN — OCTREOTIDE ACETATE 100 MICROGRAM(S): 1000 INJECTION INTRAVENOUS; SUBCUTANEOUS at 13:47

## 2024-11-03 RX ADMIN — DEXAMETHASONE 1.5 MG 4 MILLIGRAM(S): 1.5 TABLET ORAL at 17:11

## 2024-11-03 RX ADMIN — Medication 10 MILLIGRAM(S): at 05:33

## 2024-11-03 NOTE — H&P ADULT - NSICDXPASTMEDICALHX_GEN_ALL_CORE_FT
PAST MEDICAL HISTORY:  Depression     HLD (hyperlipidemia)     HTN (hypertension)     Pre-diabetes      PAST MEDICAL HISTORY:  Anxiety     Depression     HLD (hyperlipidemia)     HTN (hypertension)     Iron deficiency anemia     T2DM (type 2 diabetes mellitus)

## 2024-11-03 NOTE — PROGRESS NOTE ADULT - ASSESSMENT
64M with PMHx of HLD, HTN, T2DM, anemia, who was found to have 2 large small bowel polyps during anemia workup and was planned to follow up with advanced GI at [facility] for possible removal with history of multiple SBOs 2/2 polyps that were medically managed in the past who presented with abdominal bloating, n/v, and no bowel function x 2 days. Surgery consulted for SBO management, pt elected to manage SBO medically. NGT placement attempted but pt with PSHx of transsphenoidal meningioma resection precluding successful placement. Pt now with bowel function and clinically improved.     P:   - Malignant SBO protocol     - Reglan 110mg IV push q8h    - Octreotide 100mg sc q8h    - Decadron 4mg IV push q12h  - Diet: advance to CLD   - VTE ppx: LVX  - Pain control PRN   - Dispo: d/c on FLD and f/u with [facility] or Dr. Mariluz Phipps (depending on pt preference)       [facility] Team Surgery  [id number], [phone]

## 2024-11-03 NOTE — H&P ADULT - PROBLEM SELECTOR PLAN 3
- F/u HbA1c  - Patient on metformin outpatient  - ROBERT for now - On Duloxetine 30mg BID, resume once cleared for PO intake

## 2024-11-03 NOTE — H&P ADULT - HISTORY OF PRESENT ILLNESS
This is a 65 y/o male w/ PMHx HTN, HLD, T2DM, anxiety/depression, small bowel polyps, and medically managed SBO who presents for abdominal bloating and discomfort. He has been having these symptoms since 11/1, then yesterday morning, had 1 large episode of vomiting as well. He has a history of SBO that has been getting medically managed since 2023. Of note, he was recently diagnosed with 2 large small bowel polyps, [place] [facility] [place]. These polyps were found when he was being worked up for new iron deficiency anemia, and capsule endoscopy found gastric erosions, jejunal ulcers, and large 5cm polyp in the duodenum as well as the jejunum (unable to see records of EGD, this is per patient). Came to the ED for his symptoms.     In the ED, he was afebrile and hemodynamically stable, saturating well on RA. CBC w/ WBC 12.76, CMP w/ mild hyperkalemia of 10.7. CT A/P w/ oral and IV contrast was done, revealed a significantly distended and fluid-filled stomach with most of the proximal and mid abdominal small bowel loops with visible air-fluid levels suggesting small bowel obstruction with likely a transition present in the mid abdomen. He was given IV tylenol, Zofran and 1.5L IVNS in the ED. He currently feels well, states that he has been passing gas since being here, doesn't have any nausea or vomiting at the moment. This is a 63 y/o male w/ PMHx HTN, HLD, pre-diabetes, depression, small bowel polyps, and medically managed SBO who presents for abdominal bloating and discomfort. He has been having these symptoms since 11/1, then yesterday morning, had 1 large episode of vomiting as well. He has a history of SBO that has been getting medically managed since 2023. Of note, he was recently diagnosed with 2 large small bowel polyps, supposedly benign in etiology, pending push enteroscopy at [facility] [place] for resection. Came to the ED for his symptoms.     In the ED, he was afebrile and hemodynamically stable, saturating well on RA. CBC w/ WBC 12.76, CMP w/ mild hyperkalemia of 10.7. CT A/P w/ oral and IV contrast was done, revealed a significantly distended and fluid-filled stomach with most of the proximal and mid abdominal small bowel loops with visible air-fluid levels suggesting small bowel obstruction with likely a transition present in the mid abdomen. He was given IV tylenol, Zofran and 1.5L IVNS in the ED.  This is a 63 y/o male w/ PMHx HTN, HLD, T2DM, depression, small bowel polyps, and medically managed SBO who presents for abdominal bloating and discomfort. He has been having these symptoms since 11/1, then yesterday morning, had 1 large episode of vomiting as well. He has a history of SBO that has been getting medically managed since 2023. Of note, he was recently diagnosed with 2 large small bowel polyps, supposedly benign in etiology, pending push enteroscopy at [facility] for [place]. These polyps were found last year when he was being worked up for new iron deficiency anemia, and capsule endoscopy found gastric erosions, jejunal ulcers, and polyps. Came to the ED for his symptoms.     In the ED, he was afebrile and hemodynamically stable, saturating well on RA. CBC w/ WBC 12.76, CMP w/ mild hyperkalemia of 10.7. CT A/P w/ oral and IV contrast was done, revealed a significantly distended and fluid-filled stomach with most of the proximal and mid abdominal small bowel loops with visible air-fluid levels suggesting small bowel obstruction with likely a transition present in the mid abdomen. He was given IV tylenol, Zofran and 1.5L IVNS in the ED.

## 2024-11-03 NOTE — H&P ADULT - NSHPLABSRESULTS_GEN_ALL_CORE
17.0   12.76 )-----------( 294      ( 02 Nov 2024 14:20 )             51.2     11-02    141  |  101  |  16  ----------------------------<  187[H]  4.6   |  25  |  0.89    Ca    10.7[H]      02 Nov 2024 14:20    TPro  8.5[H]  /  Alb  4.9  /  TBili  0.9  /  DBili  x   /  AST  20  /  ALT  24  /  AlkPhos  93  11-02          LIVER FUNCTIONS - ( 02 Nov 2024 14:20 )  Alb: 4.9 g/dL / Pro: 8.5 g/dL / ALK PHOS: 93 U/L / ALT: 24 U/L / AST: 20 U/L / GGT: x           PT/INR - ( 02 Nov 2024 14:20 )   PT: 11.8 sec;   INR: 1.04 ratio         PTT - ( 02 Nov 2024 14:20 )  PTT:26.0 sec  Urinalysis Basic - ( 02 Nov 2024 14:20 )    Color: x / Appearance: x / SG: x / pH: x  Gluc: 187 mg/dL / Ketone: x  / Bili: x / Urobili: x   Blood: x / Protein: x / Nitrite: x   Leuk Esterase: x / RBC: x / WBC x   Sq Epi: x / Non Sq Epi: x / Bacteria: x

## 2024-11-03 NOTE — H&P ADULT - ASSESSMENT
63 y/o male w/ PMHx HTN, HLD, T2DM, anxiety/depression, small bowel polyps, and history of medically managed SBO who presents for abdominal bloating and discomfort. Found to have SBO on CT scan. Admitted for management of SBO.  65 y/o male w/ PMHx HTN, HLD, pre-diabetes, depression, small bowel polyps, and history of medically managed SBO who presents for abdominal bloating and discomfort. Found to have SBO on CT scan. Admitted for management of SBO.  63 y/o male w/ PMHx HTN, HLD, T2DM, depression, small bowel polyps, and history of medically managed SBO who presents for abdominal bloating and discomfort. Found to have SBO on CT scan. Admitted for management of SBO.

## 2024-11-03 NOTE — PROGRESS NOTE ADULT - PROBLEM SELECTOR PLAN 1
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

## 2024-11-03 NOTE — H&P ADULT - NSHPREVIEWOFSYSTEMS_GEN_ALL_CORE
Review of Systems:   CONSTITUTIONAL: No fever, weight loss  EYES: No eye pain, visual disturbances, or discharge  RESPIRATORY: No SOB. No cough, wheezing, chills or hemoptysis  CARDIOVASCULAR: No chest pain, palpitations, dizziness, or leg swelling  GASTROINTESTINAL: No abdominal or epigastric pain. No nausea, vomiting, or hematemesis; No diarrhea or constipation. No melena or hematochezia.  GENITOURINARY: No dysuria, frequency, hematuria, or incontinence  NEUROLOGICAL: No headaches, memory loss, loss of strength, numbness, or tremors  SKIN: No itching, burning, rashes, or lesions   MUSCULOSKELETAL: No joint pain or swelling; No muscle, back pain  PSYCHIATRIC: No depression, anxiety, mood swings, or difficulty sleeping  HEME/LYMPH: No easy bruising, or bleeding gums Review of Systems:   CONSTITUTIONAL: No fever, weight loss  EYES: No eye pain, visual disturbances, or discharge  RESPIRATORY: No SOB. No cough, wheezing, chills or hemoptysis  CARDIOVASCULAR: No chest pain, palpitations, dizziness, or leg swelling  GASTROINTESTINAL: +nausea and vomiting; No abdominal or epigastric pain. No hematemesis; No diarrhea or constipation. No melena or hematochezia.  GENITOURINARY: No dysuria, frequency, hematuria, or incontinence  NEUROLOGICAL: No headaches, memory loss, loss of strength, numbness, or tremors  SKIN: No itching, burning, rashes, or lesions   MUSCULOSKELETAL: No joint pain or swelling; No muscle, back pain  PSYCHIATRIC: No depression, anxiety, mood swings, or difficulty sleeping  HEME/LYMPH: No easy bruising, or bleeding gums

## 2024-11-03 NOTE — H&P ADULT - PROBLEM SELECTOR PLAN 4
- On Duloxetine 30mg BID, resume once cleared for PO intake via surgery - F/u hbA1c  - Patient on metformin outpatient  - ROBERT for now - F/u HbA1c  - Patient on metformin outpatient  - ROBERT for now

## 2024-11-03 NOTE — H&P ADULT - NSHPPHYSICALEXAM_GEN_ALL_CORE
Vital Signs Last 24 Hrs  T(C): 36.3 (02 Nov 2024 13:42), Max: 36.4 (02 Nov 2024 12:13)  T(F): 97.4 (02 Nov 2024 13:42), Max: 97.6 (02 Nov 2024 12:13)  HR: 88 (02 Nov 2024 13:42) (88 - 96)  BP: 153/93 (02 Nov 2024 13:42) (153/93 - 169/92)  BP(mean): --  RR: 16 (02 Nov 2024 13:42) (16 - 18)  SpO2: 100% (02 Nov 2024 13:42) (99% - 100%)    Parameters below as of 02 Nov 2024 13:42  Patient On (Oxygen Delivery Method): room air        CONSTITUTIONAL: Well-groomed, in no apparent distress  EYES: No conjunctival or scleral injection, non-icteric;   NECK: Trachea midline without palpable neck mass  RESPIRATORY: Breathing comfortably; lungs CTA without wheeze/rhonchi/rales  CARDIOVASCULAR: +S1S2, RRR, no M/G/R; no lower extremity edema  GASTROINTESTINAL: No palpable masses or tenderness, +BS throughout, no rebound/guarding  SKIN: No rashes or ulcers noted  NEUROLOGIC: Sensation intact in LEs b/l to light touch  PSYCHIATRIC: A+O x 3; mood and affect appropriate; appropriate insight and judgment Vital Signs Last 24 Hrs  T(C): 36.3 (02 Nov 2024 13:42), Max: 36.4 (02 Nov 2024 12:13)  T(F): 97.4 (02 Nov 2024 13:42), Max: 97.6 (02 Nov 2024 12:13)  HR: 88 (02 Nov 2024 13:42) (88 - 96)  BP: 153/93 (02 Nov 2024 13:42) (153/93 - 169/92)  BP(mean): --  RR: 16 (02 Nov 2024 13:42) (16 - 18)  SpO2: 100% (02 Nov 2024 13:42) (99% - 100%)    Parameters below as of 02 Nov 2024 13:42  Patient On (Oxygen Delivery Method): room air        CONSTITUTIONAL: Well-groomed, in no apparent distress  EYES: No conjunctival or scleral injection, non-icteric;   NECK: Trachea midline without palpable neck mass  RESPIRATORY: Breathing comfortably; lungs CTA without wheeze/rhonchi/rales  CARDIOVASCULAR: +S1S2, RRR, no M/G/R; no lower extremity edema  GASTROINTESTINAL: No palpable masses or tenderness, hypoactive BS throughout, no rebound/guarding  SKIN: No rashes or ulcers noted  NEUROLOGIC: Sensation intact in LEs b/l to light touch  PSYCHIATRIC: A+O x 3; mood and affect appropriate; appropriate insight and judgment

## 2024-11-03 NOTE — H&P ADULT - PROBLEM SELECTOR PLAN 1
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 at [facility], had post-op CSF leak which was repaired via transsphenoidal approach  - ED team offered NSGY eval tor recs given recent transsphenoidal procedure and failed NGT attempts here, however, patient deferred, he wants the SBO medically managed and then follow-up outpatient w/ [facility] once stable  - Will start LR 100cc/hr  - NPO for now, f/u w/ surgery to determine when to restart diet  - Start malignant SBO protocol w/ Reglan 10mg q8hrs + octreotide 100mg SC q8hrs + Decadron 4mg q12hrs - CT A/P showing significantly distended and fluid-filled stomach with most of the proximal and mid abdominal small bowel loops with visible air-fluid levels suggesting small bowel obstruction with likely a transition present in the mid abdomen, with some irregularity of the small bowel wall   - Patient does have 2 large small bowel polyps that he is planned for removal via push enteroscopy at [facility]   - Surgical oncology consulted, they attempted NGT multiple times but failed  - Of note, 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, had post-op CSF leak which was repaired via transsphenoidal approach  - ED team offered NSGY eval tor recs given recent transsphenoidal procedure and failed NGT attempts here, however, patient deferred, he wants the SBO medically managed and then follow-up outpatient w/ [facility] once stable  - Will start LR 100cc/hr  - NPO for now  - Start malignant SBO protocol?  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

## 2024-11-03 NOTE — PROGRESS NOTE ADULT - ASSESSMENT
65 y/o male w/ PMHx HTN, HLD, T2DM, anxiety/depression, small bowel polyps, and history of medically managed SBO who presents for abdominal bloating and discomfort. Found to have SBO on CT scan. Admitted for management of SBO.

## 2024-11-04 ENCOUNTER — TRANSCRIPTION ENCOUNTER (OUTPATIENT)
Age: 64
End: 2024-11-04

## 2024-11-04 VITALS
SYSTOLIC BLOOD PRESSURE: 152 MMHG | HEART RATE: 65 BPM | DIASTOLIC BLOOD PRESSURE: 75 MMHG | TEMPERATURE: 97 F | OXYGEN SATURATION: 98 % | RESPIRATION RATE: 18 BRPM

## 2024-11-04 LAB
GLUCOSE BLDC GLUCOMTR-MCNC: 153 MG/DL — HIGH (ref 70–99)
GLUCOSE BLDC GLUCOMTR-MCNC: 175 MG/DL — HIGH (ref 70–99)

## 2024-11-04 PROCEDURE — 86900 BLOOD TYPING SEROLOGIC ABO: CPT

## 2024-11-04 PROCEDURE — 86901 BLOOD TYPING SEROLOGIC RH(D): CPT

## 2024-11-04 PROCEDURE — 74177 CT ABD & PELVIS W/CONTRAST: CPT | Mod: MC

## 2024-11-04 PROCEDURE — 85025 COMPLETE CBC W/AUTO DIFF WBC: CPT

## 2024-11-04 PROCEDURE — 83690 ASSAY OF LIPASE: CPT

## 2024-11-04 PROCEDURE — 83036 HEMOGLOBIN GLYCOSYLATED A1C: CPT

## 2024-11-04 PROCEDURE — 85610 PROTHROMBIN TIME: CPT

## 2024-11-04 PROCEDURE — 93005 ELECTROCARDIOGRAM TRACING: CPT

## 2024-11-04 PROCEDURE — 96375 TX/PRO/DX INJ NEW DRUG ADDON: CPT

## 2024-11-04 PROCEDURE — 96374 THER/PROPH/DIAG INJ IV PUSH: CPT

## 2024-11-04 PROCEDURE — 99239 HOSP IP/OBS DSCHRG MGMT >30: CPT

## 2024-11-04 PROCEDURE — 99285 EMERGENCY DEPT VISIT HI MDM: CPT | Mod: 25

## 2024-11-04 PROCEDURE — 82962 GLUCOSE BLOOD TEST: CPT

## 2024-11-04 PROCEDURE — 85730 THROMBOPLASTIN TIME PARTIAL: CPT

## 2024-11-04 PROCEDURE — 86850 RBC ANTIBODY SCREEN: CPT

## 2024-11-04 PROCEDURE — 85027 COMPLETE CBC AUTOMATED: CPT

## 2024-11-04 PROCEDURE — 80053 COMPREHEN METABOLIC PANEL: CPT

## 2024-11-04 RX ORDER — LEVOMEFOLATE CALCIUM 15 MG
1 TABLET ORAL
Refills: 0 | DISCHARGE

## 2024-11-04 RX ORDER — FERROUS SULFATE 325(65) MG
1 TABLET ORAL
Refills: 0 | DISCHARGE

## 2024-11-04 RX ADMIN — Medication 10 MILLIGRAM(S): at 06:10

## 2024-11-04 RX ADMIN — Medication 1: at 12:46

## 2024-11-04 RX ADMIN — DEXAMETHASONE 1.5 MG 4 MILLIGRAM(S): 1.5 TABLET ORAL at 06:09

## 2024-11-04 RX ADMIN — Medication 1: at 08:51

## 2024-11-04 RX ADMIN — OCTREOTIDE ACETATE 100 MICROGRAM(S): 1000 INJECTION INTRAVENOUS; SUBCUTANEOUS at 06:10

## 2024-11-04 NOTE — DISCHARGE NOTE NURSING/CASE MANAGEMENT/SOCIAL WORK - FINANCIAL ASSISTANCE
[facility] provides services at a reduced cost to those who are determined to be eligible through [facility]’s financial assistance program. Information regarding [facility]’s financial assistance program can be found by going to [email or web address] or by calling [phone].

## 2024-11-04 NOTE — PROGRESS NOTE ADULT - ASSESSMENT
64M with PMHx of meningioma s/p transsphenoidal resection, HLD, HTN, T2DM, anemia, and two large small bowel polyps c/b history of multiple SBO who presented 11/2 with bloating, N/V, and no bowel function x 2 days consistent with small bowel obstruction. He previously planned for polyp removal with advanced GI [facility] Surgery was consulted for SBO management, which patient elected to manage medically. Patient now tolerating full liquids with return of bowel function.  P:   - Malignant SBO protocol   - Reglan 110mg IV push q8h  - Octreotide 100mg sc q8h  - Decadron 4mg IV push q12h  - Diet: FLD  - Monitor for obstructive symptoms.  - Remainder of care per primary medicine team  - Dispo: d/c on FLD and f/u with [facility] or Dr. Mariluz Phipps (depending on pt preference)       [facility] Surgery  [id number], 64M with PMHx of meningioma s/p transsphenoidal resection, HLD, HTN, T2DM, anemia, and two large small bowel polyps c/b history of multiple SBO who presented 11/2 with bloating, N/V, and no bowel function x 2 days consistent with small bowel obstruction. He previously planned for polyp removal with advanced GI at [facility] Surgery was consulted for SBO management, which patient elected to manage medically. Patient now tolerating full liquids with return of bowel function.  P:   - Malignant SBO protocol   - Reglan 110mg IV push q8h  - Octreotide 100mg sc q8h  - Decadron 4mg IV push q12h  - Diet: FLD  - Monitor for obstructive symptoms.  - Remainder of care per primary medicine team  - Dispo: d/c on FLD and f/u with [facility] per patient preference      [facility] Team Surgery  [id number],

## 2024-11-04 NOTE — DISCHARGE NOTE NURSING/CASE MANAGEMENT/SOCIAL WORK - NSDCPEFALRISK_GEN_ALL_CORE
For information on Fall & Injury Prevention, visit: [email or web address] OR  [email or web address] OR  https://www.cdc.gov/steadi/patient.html

## 2024-11-04 NOTE — DISCHARGE NOTE PROVIDER - CARE PROVIDERS DIRECT ADDRESSES
[email or web address]

## 2024-11-04 NOTE — DISCHARGE NOTE PROVIDER - NSDCCPCAREPLAN_GEN_ALL_CORE_FT
PRINCIPAL DISCHARGE DIAGNOSIS  Diagnosis: Small bowel obstruction  Assessment and Plan of Treatment: Follow up with your surgical oncologist  Call to make an apointment     PRINCIPAL DISCHARGE DIAGNOSIS  Diagnosis: Small bowel obstruction  Assessment and Plan of Treatment: you recieved - Reglan 110mg IV push q8h,  Octreotide 100mg sc q8h and Decadron 4mg IV push q12h to help with SOB. Discussed with surgery will discontinue SBO protocol medication given symptoms have resolved. Surgery recommends discharge home on on Full Liquid Diet and f/u with [facility] surgery & GI in 1-2 weeks

## 2024-11-04 NOTE — DISCHARGE NOTE NURSING/CASE MANAGEMENT/SOCIAL WORK - PATIENT PORTAL LINK FT
You can access the FollowMyHealth Patient Portal offered by [facility] by registering at the following website: [email or web address]. By joining [employer]’s FollowMyHealth portal, you will also be able to view your health information using other applications (apps) compatible with our system.

## 2024-11-04 NOTE — DISCHARGE NOTE PROVIDER - CARE PROVIDER_API CALL
Paulino Carlson  Internal Medicine  [address], [address]  [place] [address]  Phone: [phone]  Fax: [phone]  Follow Up Time:    Paulino Carlson  Internal Medicine  [address], [address]  [place] [address]  Phone: [phone]  Fax: [phone]  Follow Up Time:     Mariluz Montez  Surgical Oncology  [address] [place] [address]  Phone: [phone]  Fax: [phone]  Follow Up Time:

## 2024-11-04 NOTE — DISCHARGE NOTE PROVIDER - HOSPITAL COURSE
Hospital Course:      Important Medication Changes and Reason:    Active or Pending Issues Requiring Follow-up:    Advanced Directives:   [ ] Full code  [ ] DNR  [ ] Hospice    Discharge Diagnoses:         Hospital Course:  64M with PMHx of HLD, HTN, T2DM, anemia, who was found to have 2 large small bowel polyps during anemia workup and was planned to follow up with advanced GI at [facility] for possible removal with history of multiple SBOs 2/2 polyps that were medically managed in the past. Presented to ED with abdominal bloating, n/v, and no bowel function x 2 days. Surgery consulted for SBO management, pt elected to manage SBO medically. NGT placement attempted but pt with PSHx of transsphenoidal meningioma resection precluding successful placement. Pt now with bowel function and clinically improved.     Important Medication Changes and Reason:  no change to home medication    Active or Pending Issues Requiring Follow-up:  d/c on FLD and f/u with [facility] or Dr. Mariluz Phipps     Advanced Directives:   [X] Full code  [ ] DNR  [ ] Hospice    Discharge Diagnoses: SBO            Hospital Course:  64M with PMHx of HLD, HTN, T2DM, anemia, who was found to have 2 large small bowel polyps during anemia workup and was planned to follow up with advanced GI at [facility] for possible removal with history of multiple SBOs 2/2 polyps that were medically managed in the past. Presented to ED with abdominal bloating, n/v, and no bowel function x 2 days. was admitted for SBO and seen by Surgery for management, pt elected to manage SBO medically. NGT placement attempted but pt with PSHx of transsphenoidal meningioma resection precluding successful placement. Pt now with bowel function and clinically improved.     Important Medication Changes and Reason:  no change to home medication    Active or Pending Issues Requiring Follow-up:  d/c on FLD and f/u with [facility] or Dr. Mariluz Phipps     Advanced Directives:   [X] Full code  [ ] DNR  [ ] Hospice    Discharge Diagnoses: SBO            Hospital Course:  64M with PMHx of HLD, HTN, T2DM, anemia, who was found to have 2 large small bowel polyps during anemia workup and was planned to follow up with advanced GI at [facility] for possible removal with history of multiple SBOs 2/2 polyps that were medically managed in the past. Presented to ED with abdominal bloating, n/v, and no bowel function x 2 days. was admitted for SBO and seen by Surgery for management, pt elected to manage SBO medically. NGT placement attempted but pt with PSHx of transsphenoidal meningioma resection precluding successful placement.     Patient was seen and examined on day of discharge. His symptoms have significantly improved, tolerated full liquid diet.   I dw surgery - discontinue Malignant SBO protocol medication given symptoms resolved. Surgery recommends discharge home on on FLD and f/u with [facility] surgery & GI in 1-2 weeks      Important Medication Changes and Reason:  no change to home medication    Active or Pending Issues Requiring Follow-up:  d/c on FLD and f/u with [facility] or Dr. Mariluz Phipps     Advanced Directives:   [X] Full code  [ ] DNR  [ ] Hospice    Discharge Diagnoses: SBO

## 2024-11-04 NOTE — DISCHARGE NOTE PROVIDER - PROVIDER TOKENS
PROVIDER:[TOKEN:[[id number]]] PROVIDER:[TOKEN:[[id number]]],PROVIDER:[TOKEN:[[id number]]]

## 2024-11-04 NOTE — PROGRESS NOTE ADULT - SUBJECTIVE AND OBJECTIVE BOX
[facility] SURGERY PROGRESS NOTE     Patient is a 64y old  Male who presents with a chief complaint of SBO (03 Nov 2024 08:54)      OVERNIGHT EVENTS: NAEON       SUBJECTIVE:   Patient seen and examined at bedside with surgical team. Pt states that he feels much better. No longer having any abdominal pain, no n/v, and feels less bloated. Burping has also decreased. Passing flatus and having BMs.       OBJECTIVE     Vital Signs Last 24 Hrs  T(C): 36.9 (03 Nov 2024 05:41), Max: 37 (02 Nov 2024 23:43)  T(F): 98.4 (03 Nov 2024 05:41), Max: 98.6 (02 Nov 2024 23:43)  HR: 71 (03 Nov 2024 05:41) (71 - 96)  BP: 125/66 (03 Nov 2024 05:41) (125/66 - 169/92)  BP(mean): --  RR: 18 (03 Nov 2024 05:41) (16 - 18)  SpO2: 96% (03 Nov 2024 05:41) (96% - 100%)    Parameters below as of 03 Nov 2024 05:41  Patient On (Oxygen Delivery Method): room air    I&O's Detail    02 Nov 2024 08:01  -  03 Nov 2024 07:00  --------------------------------------------------------  IN:    Lactated Ringers: 500 mL  Total IN: 500 mL    OUT:  Total OUT: 0 mL    Total NET: 500 mL          Physical Exam  Constitutional: A&Ox3, NAD  Respiratory: Breathing comfortably on RA  Gastrointestinal: Soft, nontender, nondistended  Extremities: Moving all extremities, no edema    Medications  MEDICATIONS  (STANDING):  dexAMETHasone  Injectable 4 milliGRAM(s) IV Push every 12 hours  dextrose 5%. 1000 milliLiter(s) (50 mL/Hr) IV Continuous <Continuous>  dextrose 5%. 1000 milliLiter(s) (100 mL/Hr) IV Continuous <Continuous>  dextrose 50% Injectable 25 Gram(s) IV Push once  dextrose 50% Injectable 25 Gram(s) IV Push once  dextrose 50% Injectable 12.5 Gram(s) IV Push once  enoxaparin Injectable 40 milliGRAM(s) SubCutaneous every 24 hours  glucagon  Injectable 1 milliGRAM(s) IntraMuscular once  influenza  Vaccine (HIGH DOSE) 0.5 milliLiter(s) IntraMuscular once  insulin lispro (ADMELOG) corrective regimen sliding scale   SubCutaneous three times a day before meals  lactated ringers. 1000 milliLiter(s) (100 mL/Hr) IV Continuous <Continuous>  metoclopramide Injectable 10 milliGRAM(s) IV Push every 8 hours  octreotide  Injectable 100 MICROGram(s) SubCutaneous every 8 hours    MEDICATIONS  (PRN):  dextrose Oral Gel 15 Gram(s) Oral once PRN Blood Glucose LESS THAN 70 milliGRAM(s)/deciliter        LABS:                        13.2   5.90  )-----------( 222      ( 03 Nov 2024 07:15 )             41.3     11-03    140  |  106  |  18  ----------------------------<  121[H]  3.7   |  23  |  0.80    Ca    8.5      03 Nov 2024 07:15    TPro  6.0  /  Alb  3.3  /  TBili  1.1  /  DBili  x   /  AST  12  /  ALT  17  /  AlkPhos  63  11-03    PT/INR - ( 02 Nov 2024 14:20 )   PT: 11.8 sec;   INR: 1.04 ratio         PTT - ( 02 Nov 2024 14:20 )  PTT:26.0 sec  LIVER FUNCTIONS - ( 03 Nov 2024 07:15 )  Alb: 3.3 g/dL / Pro: 6.0 g/dL / ALK PHOS: 63 U/L / ALT: 17 U/L / AST: 12 U/L / GGT: x           Urinalysis Basic - ( 03 Nov 2024 07:15 )    Color: x / Appearance: x / SG: x / pH: x  Gluc: 121 mg/dL / Ketone: x  / Bili: x / Urobili: x   Blood: x / Protein: x / Nitrite: x   Leuk Esterase: x / RBC: x / WBC x   Sq Epi: x / Non Sq Epi: x / Bacteria: x      ABO Interpretation: A (11-02-24 @ 14:58)  ABO Interpretation: A (11-02-24 @ 14:47)  
SURGERY DAILY PROGRESS NOTE    24 Hour/Overnight Events: advanced to FLD    SUBJECTIVE: Patient seen and evaluated on AM rounds. He tolerated 80% of his full liquids with some burping but no nausea or vomiting. He continues to pass flatus, did not have a bowel movement yesterday.    ------------------------------------------------------------------------------------------------------------  OBJECTIVE:  Vital Signs Last 24 Hrs  T(C): 36.6 (03 Nov 2024 23:59), Max: 36.9 (03 Nov 2024 05:41)  T(F): 97.8 (03 Nov 2024 23:59), Max: 98.4 (03 Nov 2024 05:41)  HR: 72 (03 Nov 2024 23:59) (67 - 75)  BP: 145/79 (03 Nov 2024 23:59) (125/66 - 145/79)  BP(mean): --  RR: 18 (03 Nov 2024 23:59) (18 - 18)  SpO2: 95% (03 Nov 2024 23:59) (95% - 98%)    Parameters below as of 03 Nov 2024 23:59  Patient On (Oxygen Delivery Method): room air      I&O's Detail    02 Nov 2024 08:01  -  03 Nov 2024 07:00  --------------------------------------------------------  IN:    Lactated Ringers: 500 mL  Total IN: 500 mL    OUT:  Total OUT: 0 mL    Total NET: 500 mL      03 Nov 2024 07:01  -  04 Nov 2024 02:41  --------------------------------------------------------  IN:    Lactated Ringers: 800 mL    Oral Fluid: 660 mL  Total IN: 1460 mL    OUT:  Total OUT: 0 mL    Total NET: 1460 mL          PHYSICAL EXAM:  Constitutional: Well developed, well nourished , NAD, appropriate to conversation  Chest: Symmetric chest rise bilaterally, no increased WOB on room air  Abdomen: Soft, non-distended, non-tender to palpation  Extremities: moving all extremities equally    LABS:                        13.2   5.90  )-----------( 222      ( 03 Nov 2024 07:15 )             41.3     11-03    140  |  106  |  18  ----------------------------<  121[H]  3.7   |  23  |  0.80    Ca    8.5      03 Nov 2024 07:15    TPro  6.0  /  Alb  3.3  /  TBili  1.1  /  DBili  x   /  AST  12  /  ALT  17  /  AlkPhos  63  11-03    LIVER FUNCTIONS - ( 03 Nov 2024 07:15 )  Alb: 3.3 g/dL / Pro: 6.0 g/dL / ALK PHOS: 63 U/L / ALT: 17 U/L / AST: 12 U/L / GGT: x           PT/INR - ( 02 Nov 2024 14:20 )   PT: 11.8 sec;   INR: 1.04 ratio         PTT - ( 02 Nov 2024 14:20 )  PTT:26.0 sec    
Ryan Castañeda DO  Available on [employer] TEAMS    Patient is a 64y old  Male who presents with a chief complaint of SBO (03 Nov 2024 00:31)      INTERVAL HPI/OVERNIGHT EVENTS: Patient seen and examined at bedside. No overnight events. Denies fever, chills, chest pain, shortness of breath, abdominal pain, nausea/vomiting, headache.    MEDICATIONS  (STANDING):  dexAMETHasone  Injectable 4 milliGRAM(s) IV Push every 12 hours  dextrose 5%. 1000 milliLiter(s) (100 mL/Hr) IV Continuous <Continuous>  dextrose 5%. 1000 milliLiter(s) (50 mL/Hr) IV Continuous <Continuous>  dextrose 50% Injectable 25 Gram(s) IV Push once  dextrose 50% Injectable 25 Gram(s) IV Push once  dextrose 50% Injectable 12.5 Gram(s) IV Push once  enoxaparin Injectable 40 milliGRAM(s) SubCutaneous every 24 hours  glucagon  Injectable 1 milliGRAM(s) IntraMuscular once  influenza  Vaccine (HIGH DOSE) 0.5 milliLiter(s) IntraMuscular once  insulin lispro (ADMELOG) corrective regimen sliding scale   SubCutaneous three times a day before meals  lactated ringers. 1000 milliLiter(s) (100 mL/Hr) IV Continuous <Continuous>  metoclopramide Injectable 10 milliGRAM(s) IV Push every 8 hours  octreotide  Injectable 100 MICROGram(s) SubCutaneous every 8 hours    MEDICATIONS  (PRN):  dextrose Oral Gel 15 Gram(s) Oral once PRN Blood Glucose LESS THAN 70 milliGRAM(s)/deciliter      Allergies    No Known Allergies    Intolerances        REVIEW OF SYSTEMS:  All other review of systems is negative unless indicated above    Vital Signs Last 24 Hrs  T(C): 36.9 (03 Nov 2024 05:41), Max: 37 (02 Nov 2024 23:43)  T(F): 98.4 (03 Nov 2024 05:41), Max: 98.6 (02 Nov 2024 23:43)  HR: 71 (03 Nov 2024 05:41) (71 - 96)  BP: 125/66 (03 Nov 2024 05:41) (125/66 - 169/92)  BP(mean): --  RR: 18 (03 Nov 2024 05:41) (16 - 18)  SpO2: 96% (03 Nov 2024 05:41) (96% - 100%)    Parameters below as of 03 Nov 2024 05:41  Patient On (Oxygen Delivery Method): room air        PHYSICAL EXAM:  GENERAL: NAD  HEENT:  anicteric, moist mucous membranes  CHEST/LUNG:  CTA b/l, no rales, wheezes, or rhonchi  HEART:  RRR, S1, S2  ABDOMEN:  BS+, soft, nontender, nondistended  EXTREMITIES: no edema, cyanosis, or calf tenderness  NERVOUS SYSTEM: answers questions and follows commands appropriately    LABS:                        13.2   5.90  )-----------( 222      ( 03 Nov 2024 07:15 )             41.3     CBC Full  -  ( 03 Nov 2024 07:15 )  WBC Count : 5.90 K/uL  Hemoglobin : 13.2 g/dL  Hematocrit : 41.3 %  Platelet Count - Automated : 222 K/uL  Mean Cell Volume : 88.8 fl  Mean Cell Hemoglobin : 28.4 pg  Mean Cell Hemoglobin Concentration : 32.0 g/dL  Auto Neutrophil # : x  Auto Lymphocyte # : x  Auto Monocyte # : x  Auto Eosinophil # : x  Auto Basophil # : x  Auto Neutrophil % : x  Auto Lymphocyte % : x  Auto Monocyte % : x  Auto Eosinophil % : x  Auto Basophil % : x    03 Nov 2024 07:15    140    |  106    |  18     ----------------------------<  121    3.7     |  23     |  0.80     Ca    8.5        03 Nov 2024 07:15    TPro  6.0    /  Alb  3.3    /  TBili  1.1    /  DBili  x      /  AST  12     /  ALT  17     /  AlkPhos  63     03 Nov 2024 07:15    PT/INR - ( 02 Nov 2024 14:20 )   PT: 11.8 sec;   INR: 1.04 ratio         PTT - ( 02 Nov 2024 14:20 )  PTT:26.0 sec  Urinalysis Basic - ( 03 Nov 2024 07:15 )    Color: x / Appearance: x / SG: x / pH: x  Gluc: 121 mg/dL / Ketone: x  / Bili: x / Urobili: x   Blood: x / Protein: x / Nitrite: x   Leuk Esterase: x / RBC: x / WBC x   Sq Epi: x / Non Sq Epi: x / Bacteria: x      CAPILLARY BLOOD GLUCOSE      POCT Blood Glucose.: 159 mg/dL (03 Nov 2024 08:33)          RADIOLOGY & ADDITIONAL TESTS:    Personally reviewed.     Consultant(s) Notes Reviewed:  [x] YES  [ ] NO

## 2024-11-04 NOTE — DISCHARGE NOTE PROVIDER - NSDCMRMEDTOKEN_GEN_ALL_CORE_FT
Crestor 10 mg oral tablet: 1 tab(s) orally once a day  DULoxetine 30 mg oral delayed release capsule: 1 cap(s) orally 2 times a day  metFORMIN 1000 mg oral tablet, extended release: 1 tab(s) orally 2 times a day

## 2024-11-04 NOTE — DISCHARGE NOTE PROVIDER - NSDCFUADDAPPT_GEN_ALL_CORE_FT
APPTS ARE READY TO BE MADE: [X] YES    Best Family or Patient Contact (if needed):    Additional Information about above appointments (if needed):    1:   2:   3:     Other comments or requests:    APPTS ARE READY TO BE MADE: [X] YES    Best Family or Patient Contact (if needed):    Additional Information about above appointments (if needed):    1:   2:   3:     Other comments or requests:     Patient was outreached but did not answer. A voicemail was left for the patient to return our call.

## 2024-12-11 ENCOUNTER — NON-APPOINTMENT (OUTPATIENT)
Age: 64
End: 2024-12-11

## 2025-01-21 NOTE — PATIENT PROFILE ADULT - IS THERE A SUSPICION OF ABUSE/NEGLIGENCE?
Problem: PROBLEM: INABILITY TO CARE FOR SELF/OTHERS DUE TO INCREASED SYMPTOMS OF PSYCHOSIS  Goal: Therapy STG: Identify at least two grounding strategies as indicated by therapy progress notes and/or including as coping strategy in safety plan  Outcome: Monitoring/Evaluating progress  Goal: Therapy STG: Demonstrate ability to collaborate with therapist by completing the Elio [facility] Safety Plan  Outcome: Monitoring/Evaluating progress     Problem: PROBLEM: INABILITY TO CARE FOR SELF/OTHERS DUE TO INCREASED SYMPTOMS OF PSYCHOSIS  Goal: Therapy STG: Demonstrate ability to answer questions coherently as indicated by completion of the psychosocial assessment  Outcome: Met, complete goal     Problem: Pain  Goal: Acceptable pain level achieved/maintained at rest using appropriate pain scale for the patient  Outcome: Met, complete goal  Goal: Acceptable pain level achieved/maintained with activity using appropriate pain scale for the patient  Outcome: Met, complete goal  Goal: Acceptable pain level achieved/maintained without oversedation  Outcome: Met, complete goal      no